# Patient Record
Sex: FEMALE | Race: WHITE | NOT HISPANIC OR LATINO | Employment: FULL TIME | URBAN - METROPOLITAN AREA
[De-identification: names, ages, dates, MRNs, and addresses within clinical notes are randomized per-mention and may not be internally consistent; named-entity substitution may affect disease eponyms.]

---

## 2017-02-28 ENCOUNTER — OFFICE VISIT (OUTPATIENT)
Dept: MEDICAL GROUP | Facility: PHYSICIAN GROUP | Age: 67
End: 2017-02-28
Payer: COMMERCIAL

## 2017-02-28 VITALS
OXYGEN SATURATION: 94 % | TEMPERATURE: 98.8 F | SYSTOLIC BLOOD PRESSURE: 124 MMHG | HEIGHT: 62 IN | HEART RATE: 80 BPM | RESPIRATION RATE: 16 BRPM | BODY MASS INDEX: 40.48 KG/M2 | WEIGHT: 220 LBS | DIASTOLIC BLOOD PRESSURE: 70 MMHG

## 2017-02-28 DIAGNOSIS — Z63.8 STRESS DUE TO FAMILY TENSION: ICD-10-CM

## 2017-02-28 DIAGNOSIS — E66.01 OBESITY, MORBID, BMI 40.0-49.9 (HCC): ICD-10-CM

## 2017-02-28 DIAGNOSIS — I10 ESSENTIAL HYPERTENSION: ICD-10-CM

## 2017-02-28 DIAGNOSIS — Z12.11 SCREEN FOR COLON CANCER: ICD-10-CM

## 2017-02-28 DIAGNOSIS — E55.9 VITAMIN D DEFICIENCY: ICD-10-CM

## 2017-02-28 DIAGNOSIS — E78.5 DYSLIPIDEMIA: ICD-10-CM

## 2017-02-28 DIAGNOSIS — Z12.39 SCREENING FOR BREAST CANCER: ICD-10-CM

## 2017-02-28 DIAGNOSIS — Z23 NEED FOR VACCINATION WITH 13-POLYVALENT PNEUMOCOCCAL CONJUGATE VACCINE: ICD-10-CM

## 2017-02-28 DIAGNOSIS — Z71.89 COUNSELING ON HEALTH PROMOTION AND DISEASE PREVENTION: ICD-10-CM

## 2017-02-28 PROCEDURE — 90670 PCV13 VACCINE IM: CPT | Performed by: NURSE PRACTITIONER

## 2017-02-28 PROCEDURE — 99215 OFFICE O/P EST HI 40 MIN: CPT | Mod: 25 | Performed by: NURSE PRACTITIONER

## 2017-02-28 PROCEDURE — 90471 IMMUNIZATION ADMIN: CPT | Performed by: NURSE PRACTITIONER

## 2017-02-28 SDOH — SOCIAL STABILITY - SOCIAL INSECURITY: OTHER SPECIFIED PROBLEMS RELATED TO PRIMARY SUPPORT GROUP: Z63.8

## 2017-02-28 ASSESSMENT — PATIENT HEALTH QUESTIONNAIRE - PHQ9: CLINICAL INTERPRETATION OF PHQ2 SCORE: 0

## 2017-02-28 NOTE — ASSESSMENT & PLAN NOTE
Managed with Prinzide 10-12.5 mg qd  No symptoms low BP: light-headed, tunnel-vision, unusual fatigue.   No symptoms high BP: pounding headache, visual changes, palpitations, flushed face. No medicine adverse effects: unusual fatigue, slow heartbeat, cough.

## 2017-02-28 NOTE — ASSESSMENT & PLAN NOTE
Her weight has decreased since last visit 6.2015, 12 pound weight loss. 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

## 2017-02-28 NOTE — MR AVS SNAPSHOT
"Alessia Middleton   2017 2:35 PM   Office Visit   MRN: [id number]    Department:  [facility]   Dept Phone:  [phone]    Description:  Female : 1950   Provider:  MONICA Joy           Reason for Visit     Annual Exam wellness exam       Allergies as of 2017     No Known Allergies      You were diagnosed with     Encounter for preventive health examination   [472786]       Counseling on health promotion and disease prevention   [991861]       Essential hypertension   [9446278]       Dyslipidemia   [549751]       Vitamin D deficiency   [5977076]       Obesity, morbid, BMI 40.0-49.9 (CMS-[facility])   [597233]       Screening for breast cancer   [766400]       Screen for colon cancer   [491303]       Need for vaccination with 13-polyvalent pneumococcal conjugate vaccine   [2824327]         Vital Signs     Blood Pressure Pulse Temperature Respirations Height Weight    124/70 mmHg 80 37.1 °C (98.8 °F) 16 1.575 m (5' 2\") 99.791 kg (220 lb)    Body Mass Index Oxygen Saturation Smoking Status             40.23 kg/m2 94% Current Every Day Smoker         Basic Information     Date Of Birth Sex Race Ethnicity Preferred Language    1950 Female White Non- English      Your appointments     Aug 29, 2017  2:55 PM   Established Patient with MONICA Joy   [facility])    [address] [place] [address], [address]  [facility] [id number] [phone]           You will be receiving a confirmation call a few days before your appointment from our automated call confirmation system.              Problem List              ICD-10-CM Priority Class Noted - Resolved    Dyslipidemia E78.5   2014 - Present    Essential hypertension I10   2014 - Present    Vitamin D deficiency E55.9   2015 - Present    Obesity, morbid, BMI 40.0-49.9 (CMS-[facility]) E66.01   2015 - Present      Health Maintenance        Date Due Completion Dates    IMM " DTaP/Tdap/Td Vaccine (1 - Tdap) 4/13/1967 ---    MAMMOGRAM 4/13/1988 ---    IMM ZOSTER VACCINE 4/13/2008 ---    BONE DENSITY 4/13/2013 ---    IMM PNEUMOCOCCAL 65+ (ADULT) LOW/MEDIUM RISK SERIES (1 of 2 - PCV13) 4/13/2013 ---    COLON CANCER SCREENING ANNUAL FIT 2/9/2017 2/9/2016            Current Immunizations     13-VALENT PCV PREVNAR  Incomplete    Influenza Vaccine Quad Inj (Preserved) 10/25/2016      Below and/or attached are the medications your provider expects you to take. Review all of your home medications and newly ordered medications with your provider and/or pharmacist. Follow medication instructions as directed by your provider and/or pharmacist. Please keep your medication list with you and share with your provider. Update the information when medications are discontinued, doses are changed, or new medications (including over-the-counter products) are added; and carry medication information at all times in the event of emergency situations     Allergies:  No Known Allergies          Medications  Valid as of: February 28, 2017 -  3:50 PM    Generic Name Brand Name Tablet Size Instructions for use    Aspirin (Tablet Delayed Response) ECOTRIN 81 MG Take 81 mg by mouth every day.        Cholecalciferol (Tab) cholecalciferol 1000 UNIT Take 1,000 Units by mouth every day.        Ergocalciferol (Cap) DRISDOL 88659 UNITS Take 1 Cap by mouth every 7 days.        Lisinopril-Hydrochlorothiazide (Tab) PRINZIDE, ZESTORETIC 10-12.5 MG TAKE ONE TABLET BY MOUTH ONCE DAILY        Pravastatin Sodium (Tab) PRAVACHOL 40 MG TAKE ONE TABLET BY MOUTH ONCE DAILY        .                 Medicines prescribed today were sent to:     [facility] [address] [place] [address] - [address] [place] [id number]    Phone: [phone] Fax: [phone]    Open 24 Hours?: No      Medication refill instructions:       If your prescription bottle indicates you have medication refills left, it is not necessary  to call your provider’s office. Please contact your pharmacy and they will refill your medication.    If your prescription bottle indicates you do not have any refills left, you may request refills at any time through one of the following ways: The online Secure Computing system (except Urgent Care), by calling your provider’s office, or by asking your pharmacy to contact your provider’s office with a refill request. Medication refills are processed only during regular business hours and may not be available until the next business day. Your provider may request additional information or to have a follow-up visit with you prior to refilling your medication.   *Please Note: Medication refills are assigned a new Rx number when refilled electronically. Your pharmacy may indicate that no refills were authorized even though a new prescription for the same medication is available at the pharmacy. Please request the medicine by name with the pharmacy before contacting your provider for a refill.        Your To Do List     Future Labs/Procedures Complete By Expires    OCCULT BLOOD FECES IMMUNOASSAY  As directed 3/1/2018    VITAMIN D,25 HYDROXY  As directed 3/1/2018         Secure Computing Access Code: [email or web address]  Expires: 3/30/2017  2:14 PM    Secure Computing  A secure, online tool to manage your health information     [employer]’s Secure Computing® is a secure, online tool that connects you to your personalized health information from the privacy of your home -- day or night - making it very easy for you to manage your healthcare. Once the activation process is completed, you can even access your medical information using the Secure Computing dalton, which is available for free in the Apple Dalton store or Google Play store.     Secure Computing provides the following levels of access (as shown below):   My Chart Features   [facility] Doctor [facility]  Specialists [facility]  Urgent  Care Non-[facility]  Primary Care  Doctor   Email your healthcare team securely and  privately 24/7 X X X    Manage appointments: schedule your next appointment; view details of past/upcoming appointments X      Request prescription refills. X      View recent personal medical records, including lab and immunizations X X X X   View health record, including health history, allergies, medications X X X X   Read reports about your outpatient visits, procedures, consult and ER notes X X X X   See your discharge summary, which is a recap of your hospital and/or ER visit that includes your diagnosis, lab results, and care plan. X X       How to register for [employer]:  1. Go to  [email or web address].  2. Click on the Sign Up Now box, which takes you to the New Member Sign Up page. You will need to provide the following information:  a. Enter your A[employer]Wizz Access Code exactly as it appears at the top of this page. (You will not need to use this code after you’ve completed the sign-up process. If you do not sign up before the expiration date, you must request a new code.)   b. Enter your date of birth.   c. Enter your home email address.   d. Click Submit, and follow the next screen’s instructions.  3. Create a AdsWizz ID. This will be your AdsWizz login ID and cannot be changed, so think of one that is secure and easy to remember.  4. Create a A[employer]Wizz password. You can change your password at any time.  5. Enter your Password Reset Question and Answer. This can be used at a later time if you forget your password.   6. Enter your e-mail address. This allows you to receive e-mail notifications when new information is available in AdsWizz.  7. Click Sign Up. You can now view your health information.    For assistance activating your AdsWizz account, call [phone]

## 2017-02-28 NOTE — PROGRESS NOTES
Subjective:     Chief Complaint   Patient presents with   • Annual Exam   • Chronic Care Management        Alessia Middleton is a 66 y.o. female here today for preventive exam but last appointment for evaluation and management of chronic conditions was June 2015.    Essential hypertension  Managed with Prinzide 10-12.5 mg qd  No symptoms low BP: light-headed, tunnel-vision, unusual fatigue.   No symptoms high BP: pounding headache, visual changes, palpitations, flushed face. No medicine adverse effects: unusual fatigue, slow heartbeat, cough.    Dyslipidemia  Managed with pravastatin 40 mg daily    Obesity, morbid, BMI 40.0-49.9 (CMS-[facility])  Her weight has decreased since last visit 6.2015, 12 pound weight loss. 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         She is experiencing some stress at this time secondary to caring for her mother. Her mother spent 12 hours away and she frequently has to visit for various medical issues. Her mother is still angry and blames her for the death of her father 5 years ago which is difficult. Patient's father was on hospice at the time of his death from GI..    ROS   Denies LOCKHART, chest pain, shortness of breath, abdominal pain, bladder or bowel changes, lower extremity edema.    Current medicines (including changes today)  Current Outpatient Prescriptions   Medication Sig Dispense Refill   • vitamin D (CHOLECALCIFEROL) 1000 UNIT Tab Take 1,000 Units by mouth every day.     • lisinopril-hydrochlorothiazide (PRINZIDE, ZESTORETIC) 10-12.5 MG per tablet TAKE ONE TABLET BY MOUTH ONCE DAILY 90 Tab 0   • pravastatin (PRAVACHOL) 40 MG tablet TAKE ONE TABLET BY MOUTH ONCE DAILY 90 Tab 0   • aspirin EC (ECOTRIN) 81 MG TBEC Take 81 mg by mouth every day.     • vitamin D, Ergocalciferol, (DRISDOL) 88705 UNITS Cap capsule Take 1 Cap by mouth every 7 days. 12 Cap 1     No current facility-administered medications for  "this visit.       She  has a past medical history of Hypertension; Hyperlipidemia; and Obesity, morbid, BMI 40.0-49.9 (CMS-[facility]) (6/17/2015).    Allergies Review of patient's allergies indicates no known allergies.    Current medications, problem list, allergies, past medical history, and tobacco use history reviewed in [facility] today.    Health maintenance reviewed and updated.    Objective:   Blood pressure 124/70, pulse 80, temperature 37.1 °C (98.8 °F), resp. rate 16, height 1.575 m (5' 2\"), weight 99.791 kg (220 lb), SpO2 94 %. Body mass index is 40.23 kg/(m^2).     Physical Exam   Constitutional: Alert, no acute distress. Pleasant and cooperative with the examination.  Skin:   Warm, dry, no rashes in visible areas.    Eyes:   Pupils equal, round. Conjunctiva and sclera clear,    Lids normal.  ENT:  Pinna normal. TM intact.     Oral mucous membranes pink.     Oropharynx clear.  Neck:   Supple, trachea midline.  Lungs:  Normal effort and respirations. Clear to auscultation bilaterally.  CV:  Regular rate and rhythm.  MS/Ext:  Steady gait, no edema.  Psych:  Eye contact is good, affect calm.    Assessment and Plan:   The following treatment plan was discussed    1. Essential hypertension  Stable. Continue current medicines. Monitor labs regularly.       2. Dyslipidemia  Unknown control. She'll continue current medications and have labs done as soon as possible.    3. Vitamin D deficiency  Unknown control, she'll continue daily supplement and have labs done.  - VITAMIN D,25 HYDROXY; Future    4. Obesity, morbid, BMI 40.0-49.9 (CMS-H[facility])    - Patient identified as having weight management issue.  Appropriate orders and counseling given.    5. Screening for breast cancer    - MA-SCREEN MAMMO W/CAD-BILAT    6. Screen for colon cancer    - OCCULT BLOOD FECES IMMUNOASSAY; Future    7.  Counseling on health promotion and disease prevention  Patient counseled regarding health promotion and safety including: immunizations, " screenings, skin care, diet, exercise, dental care, and eye care.     8. Need for vaccination with 13-polyvalent pneumococcal conjugate vaccine  The patient was counseled on the requirements, possible side effects, and future requirements of immunizations for today, including all of the components of combination vaccines.  The vaccinations were approved to be given. Immunizations given by MA after completion of vaccine screening checklist, under the direction of Dr. Nuvia Rios.    - PNEUMOCOCCAL CONJUGATE VACCINE 13-VALENT      Followup: Return in about 6 months (around 8/28/2017).  As scheduled, sooner if symptoms don't resolve or with any new problems.       Patient was seen for 40 minutes, more than 50% of time spent in face to face review, consultation, counseling, and arranging future evaluation and follow up of medical conditions and care.     Reviewed side effects, risks, and benefits of medications prescribed today.  Advised to take all medications as instructed and report any side effects.   The patient voices understanding and agrees.  Report any new or worsening symptoms.  Have labs or other diagnostic studies prior to follow up.  Keep all appointments for any referrals given.      Please note this dictation was created using voice recognition software. Every reasonable attempt has been made to correct obvious errors, however there may be errors of grammar and possibly content that were not discovered before finalizing the note.

## 2017-04-03 ENCOUNTER — HOSPITAL ENCOUNTER (OUTPATIENT)
Facility: MEDICAL CENTER | Age: 67
End: 2017-04-03
Attending: NURSE PRACTITIONER
Payer: COMMERCIAL

## 2017-04-03 PROCEDURE — 82274 ASSAY TEST FOR BLOOD FECAL: CPT

## 2017-04-04 ENCOUNTER — TELEPHONE (OUTPATIENT)
Dept: MEDICAL GROUP | Facility: PHYSICIAN GROUP | Age: 67
End: 2017-04-04

## 2017-04-04 DIAGNOSIS — I10 ESSENTIAL HYPERTENSION: ICD-10-CM

## 2017-04-04 DIAGNOSIS — E78.5 DYSLIPIDEMIA: ICD-10-CM

## 2017-04-04 NOTE — TELEPHONE ENCOUNTER
Call patient, vitamin D level is low so she should increase her daily supplement and take  2000 international units daily.  Also remind her to have fasting labs completed for cholesterol and metabolic panel. Orders in epic.  LAKEISHA Joy.

## 2017-04-11 DIAGNOSIS — Z12.11 SCREEN FOR COLON CANCER: ICD-10-CM

## 2017-04-12 LAB — HEMOCCULT STL QL IA: NEGATIVE

## 2017-04-18 NOTE — TELEPHONE ENCOUNTER
Was the patient seen in the last year in this department? Yes     Does patient have an active prescription for medications requested? No     Received Request Via: Pharmacy      Pt met protocol?: Yes, OV 2/17   BP Readings from Last 1 Encounters:   02/28/17 124/70

## 2017-04-19 RX ORDER — LISINOPRIL AND HYDROCHLOROTHIAZIDE 12.5; 1 MG/1; MG/1
TABLET ORAL
Qty: 90 TAB | Refills: 0 | Status: SHIPPED | OUTPATIENT
Start: 2017-04-19 | End: 2017-07-19 | Stop reason: SDUPTHER

## 2017-04-19 RX ORDER — PRAVASTATIN SODIUM 40 MG
TABLET ORAL
Qty: 90 TAB | Refills: 0 | Status: SHIPPED | OUTPATIENT
Start: 2017-04-19 | End: 2017-07-20 | Stop reason: SDUPTHER

## 2017-07-16 LAB
ALBUMIN SERPL-MCNC: 4.3 G/DL (ref 3.6–4.8)
ALBUMIN/GLOB SERPL: 1.7 {RATIO} (ref 1.2–2.2)
ALP SERPL-CCNC: 95 IU/L (ref 39–117)
ALT SERPL-CCNC: 13 IU/L (ref 0–32)
AST SERPL-CCNC: 14 IU/L (ref 0–40)
BILIRUB SERPL-MCNC: 0.3 MG/DL (ref 0–1.2)
BUN SERPL-MCNC: 12 MG/DL (ref 8–27)
BUN/CREAT SERPL: 13 (ref 12–28)
CALCIUM SERPL-MCNC: 9.6 MG/DL (ref 8.7–10.3)
CHLORIDE SERPL-SCNC: 101 MMOL/L (ref 96–106)
CHOLEST SERPL-MCNC: 175 MG/DL (ref 100–199)
CO2 SERPL-SCNC: 22 MMOL/L (ref 18–29)
COMMENT 011824: ABNORMAL
CREAT SERPL-MCNC: 0.95 MG/DL (ref 0.57–1)
GLOBULIN SER CALC-MCNC: 2.5 G/DL (ref 1.5–4.5)
GLUCOSE SERPL-MCNC: 115 MG/DL (ref 65–99)
HDLC SERPL-MCNC: 46 MG/DL
LDLC SERPL CALC-MCNC: 100 MG/DL (ref 0–99)
POTASSIUM SERPL-SCNC: 4.4 MMOL/L (ref 3.5–5.2)
PROT SERPL-MCNC: 6.8 G/DL (ref 6–8.5)
SODIUM SERPL-SCNC: 141 MMOL/L (ref 134–144)
TRIGL SERPL-MCNC: 144 MG/DL (ref 0–149)
VLDLC SERPL CALC-MCNC: 29 MG/DL (ref 5–40)

## 2017-07-21 RX ORDER — PRAVASTATIN SODIUM 40 MG
TABLET ORAL
Qty: 90 TAB | Refills: 1 | Status: SHIPPED | OUTPATIENT
Start: 2017-07-21 | End: 2018-01-11 | Stop reason: SDUPTHER

## 2017-07-21 RX ORDER — LISINOPRIL AND HYDROCHLOROTHIAZIDE 12.5; 1 MG/1; MG/1
TABLET ORAL
Qty: 90 TAB | Refills: 1 | Status: SHIPPED | OUTPATIENT
Start: 2017-07-21 | End: 2018-01-11 | Stop reason: SDUPTHER

## 2017-07-21 NOTE — TELEPHONE ENCOUNTER
Pt has had OV within the 12 month protocol and lipid panel is current. 6 month supply sent to pharmacy.   Lab Results   Component Value Date/Time    CHOLESTEROL, 07/15/2017 08:53 AM    * 07/15/2017 08:53 AM    HDL 46 07/15/2017 08:53 AM    TRIGLYCERIDES 144 07/15/2017 08:53 AM       Lab Results   Component Value Date/Time    SODIUM 141 07/15/2017 08:53 AM    POTASSIUM 4.4 07/15/2017 08:53 AM    CHLORIDE 101 07/15/2017 08:53 AM    CO2 22 07/15/2017 08:53 AM    GLUCOSE 115* 07/15/2017 08:53 AM    BUN 12 07/15/2017 08:53 AM    CREATININE 0.95 07/15/2017 08:53 AM    BUN-CREATININE RATIO 13 07/15/2017 08:53 AM     Lab Results   Component Value Date/Time    ALKALINE PHOSPHATASE 95 07/15/2017 08:53 AM    AST(SGOT) 14 07/15/2017 08:53 AM    ALT(SGPT) 13 07/15/2017 08:53 AM    TOTAL BILIRUBIN 0.3 07/15/2017 08:53 AM

## 2017-07-21 NOTE — TELEPHONE ENCOUNTER
Pt met protocol?: Yes pt last ov 2/17   BP Readings from Last 1 Encounters:   02/28/17 124/70     Lab Results   Component Value Date/Time    HDL 46 07/15/2017 08:53 AM

## 2017-08-29 ENCOUNTER — OFFICE VISIT (OUTPATIENT)
Dept: MEDICAL GROUP | Facility: PHYSICIAN GROUP | Age: 67
End: 2017-08-29
Payer: COMMERCIAL

## 2017-08-29 VITALS
OXYGEN SATURATION: 94 % | HEART RATE: 78 BPM | WEIGHT: 208 LBS | BODY MASS INDEX: 38.28 KG/M2 | DIASTOLIC BLOOD PRESSURE: 72 MMHG | SYSTOLIC BLOOD PRESSURE: 124 MMHG | TEMPERATURE: 99 F | HEIGHT: 62 IN | RESPIRATION RATE: 16 BRPM

## 2017-08-29 DIAGNOSIS — Z12.39 SCREENING FOR BREAST CANCER: ICD-10-CM

## 2017-08-29 DIAGNOSIS — E78.5 DYSLIPIDEMIA: ICD-10-CM

## 2017-08-29 DIAGNOSIS — E66.9 OBESITY, CLASS II, BMI 35-39.9: ICD-10-CM

## 2017-08-29 DIAGNOSIS — I10 ESSENTIAL HYPERTENSION: ICD-10-CM

## 2017-08-29 DIAGNOSIS — E55.9 VITAMIN D DEFICIENCY: ICD-10-CM

## 2017-08-29 PROCEDURE — 99214 OFFICE O/P EST MOD 30 MIN: CPT | Performed by: NURSE PRACTITIONER

## 2017-08-29 NOTE — PROGRESS NOTES
Subjective:     Chief Complaint   Patient presents with   • Results     review labs    • Hypertension     Alessia Middleton is a 67 y.o. female here today for evaluation and management of issues listed below.    Upcoming dental surgery, extractions. Thursday.    Essential hypertension  Managed with Prinzide 10-12.5 mg qd.   No symptoms low BP: light-headed, tunnel-vision, unusual fatigue.   No symptoms high BP: pounding headache, visual changes, palpitations, flushed face. No medicine adverse effects: unusual fatigue, slow heartbeat, cough.     Obesity, Class II, BMI 35-39.9 (CMS-[facility])  Her weight has been consistently decreasing since  6.2015, 24 pound weight loss. 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    Dyslipidemia  Managed with pravastatin 40 mg daily    Vitamin D deficiency  Previously taking over-the-counter supplement. 2014 vitamin D level was 14, 2015 vitamin D level was 20. She is now taking 2000 iu daily.     1. Essential hypertension    2. Obesity, Class II, BMI 35-39.9 (CMS-[facility])    3. Dyslipidemia    4. Vitamin D deficiency    5. Screening for breast cancer        Allergies: Review of patient's allergies indicates no known allergies.  Current medicines (including changes today)  Current Outpatient Prescriptions   Medication Sig Dispense Refill   • lisinopril-hydrochlorothiazide (PRINZIDE, ZESTORETIC) 10-12.5 MG per tablet TAKE ONE TABLET BY MOUTH ONCE DAILY 90 Tab 1   • pravastatin (PRAVACHOL) 40 MG tablet TAKE ONE TABLET BY MOUTH ONCE DAILY 90 Tab 1   • vitamin D (CHOLECALCIFEROL) 1000 UNIT Tab Take 1,000 Units by mouth every day.     • aspirin EC (ECOTRIN) 81 MG TBEC Take 81 mg by mouth every day.       No current facility-administered medications for this visit.        She  has a past medical history of Hyperlipidemia; Hypertension; and Obesity, morbid, BMI 40.0-49.9 (CMS-[facility]) (6/17/2015).    ROS   Denies HA, chest pain, shortness of breath, abdominal pain, bladder or bowel changes, lower  "extremity edema.    Health Maintenance: Reviewed and updated.      Objective:   Blood pressure 124/72, pulse 78, temperature 37.2 °C (99 °F), resp. rate 16, height 1.575 m (5' 2\"), weight 94.3 kg (208 lb), SpO2 94 %. Body mass index is 38.04 kg/m².  Physical Exam   Alert, no acute distress. Pleasant and cooperative with the examination.  Eye contact is good, affect calm.  Skin: Warm, dry, no rashes in visible areas.    Eyes: Pupils equal, round. Conjunctiva and sclera clear, lids normal.  ENT: Pinna normal.   Neck: Supple, trachea midline.  Lungs: Normal effort and respirations. Clear to auscultation bilaterally. Abdomen: No CVAT  CV: Regular rate and rhythm.  MS/Ext: Steady gait, no edema.    Results reviewed  7.15.17 labs    Assessment and Plan:   Treatment Changes: None  Alessia was seen today for results and hypertension.    Diagnoses and all orders for this visit:    Essential hypertension  Stable. Continue current medicines. Monitor labs regularly.      Obesity, Class II, BMI 35-39.9 (CMS-HCC)  -     Patient identified as having weight management issue.  Appropriate orders and counseling given.    Dyslipidemia  Stable. Continue current medicines. Monitor labs regularly.      Vitamin D deficiency  Stable. Continue current medicines. Monitor labs regularly.    Screening for breast cancer  -     MA-SCREEN MAMMO W/CAD-BILAT        Followup: Return in about 6 months (around 2/28/2018) for Preventive Care. Sooner should new symptoms or problems arise, or as previously scheduled.         Reviewed side effects, risks, and benefits of medications prescribed today.  Advised to take all medications as instructed and report any side effects.   The patient voices understanding and agrees.  Report any new or worsening symptoms.  Have labs or other diagnostic studies prior to follow up.  Keep all appointments for any referrals given.    Please note this dictation was created using voice recognition software. Every reasonable " attempt has been made to correct obvious errors, however there may be errors of grammar and possibly content that were not discovered before finalizing the note.

## 2017-08-29 NOTE — ASSESSMENT & PLAN NOTE
Previously taking over-the-counter supplement. 2014 vitamin D level was 14, 2015 vitamin D level was 20. She is now taking 2000 iu daily.

## 2017-08-29 NOTE — ASSESSMENT & PLAN NOTE
Managed with Prinzide 10-12.5 mg qd.   No symptoms low BP: light-headed, tunnel-vision, unusual fatigue.   No symptoms high BP: pounding headache, visual changes, palpitations, flushed face. No medicine adverse effects: unusual fatigue, slow heartbeat, cough.

## 2017-08-29 NOTE — ASSESSMENT & PLAN NOTE
Her weight has been consistently decreasing since  6.2015, 24 pound weight loss. 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

## 2018-01-08 ENCOUNTER — HOSPITAL ENCOUNTER (OUTPATIENT)
Facility: MEDICAL CENTER | Age: 68
End: 2018-01-08
Attending: NURSE PRACTITIONER
Payer: COMMERCIAL

## 2018-01-08 ENCOUNTER — OFFICE VISIT (OUTPATIENT)
Dept: URGENT CARE | Facility: PHYSICIAN GROUP | Age: 68
End: 2018-01-08
Payer: COMMERCIAL

## 2018-01-08 VITALS
BODY MASS INDEX: 36.21 KG/M2 | WEIGHT: 198 LBS | HEART RATE: 104 BPM | OXYGEN SATURATION: 91 % | SYSTOLIC BLOOD PRESSURE: 100 MMHG | TEMPERATURE: 99.8 F | DIASTOLIC BLOOD PRESSURE: 52 MMHG

## 2018-01-08 DIAGNOSIS — R30.0 DYSURIA: ICD-10-CM

## 2018-01-08 DIAGNOSIS — N30.01 ACUTE CYSTITIS WITH HEMATURIA: ICD-10-CM

## 2018-01-08 LAB
APPEARANCE UR: NORMAL
BILIRUB UR STRIP-MCNC: NEGATIVE MG/DL
COLOR UR AUTO: NORMAL
GLUCOSE UR STRIP.AUTO-MCNC: NEGATIVE MG/DL
KETONES UR STRIP.AUTO-MCNC: NEGATIVE MG/DL
LEUKOCYTE ESTERASE UR QL STRIP.AUTO: NORMAL
NITRITE UR QL STRIP.AUTO: POSITIVE
PH UR STRIP.AUTO: 5 [PH] (ref 5–8)
PROT UR QL STRIP: 100 MG/DL
RBC UR QL AUTO: NORMAL
SP GR UR STRIP.AUTO: 1.01
UROBILINOGEN UR STRIP-MCNC: NEGATIVE MG/DL

## 2018-01-08 PROCEDURE — 99214 OFFICE O/P EST MOD 30 MIN: CPT | Performed by: NURSE PRACTITIONER

## 2018-01-08 PROCEDURE — 87086 URINE CULTURE/COLONY COUNT: CPT

## 2018-01-08 PROCEDURE — 81002 URINALYSIS NONAUTO W/O SCOPE: CPT | Performed by: NURSE PRACTITIONER

## 2018-01-08 RX ORDER — SULFAMETHOXAZOLE AND TRIMETHOPRIM 800; 160 MG/1; MG/1
1 TABLET ORAL EVERY 12 HOURS
Qty: 6 TAB | Refills: 0 | Status: SHIPPED | OUTPATIENT
Start: 2018-01-08 | End: 2018-01-11

## 2018-01-08 ASSESSMENT — ENCOUNTER SYMPTOMS
VOMITING: 0
SHORTNESS OF BREATH: 0
FEVER: 0
MYALGIAS: 0
EYE PAIN: 0
NAUSEA: 0
FLANK PAIN: 0
SORE THROAT: 0
CHILLS: 1
DIZZINESS: 0

## 2018-01-09 DIAGNOSIS — N30.01 ACUTE CYSTITIS WITH HEMATURIA: ICD-10-CM

## 2018-01-09 DIAGNOSIS — R30.0 DYSURIA: ICD-10-CM

## 2018-01-09 NOTE — PROGRESS NOTES
Subjective:     Alessia Middleton is a 67 y.o. female who presents for Dysuria (Lower back pain  x6 days )       Dysuria    This is a new problem. Episode onset: 6 days. The problem occurs every urination. The problem has been gradually worsening. The quality of the pain is described as burning. The pain is at a severity of 6/10. The pain is moderate. The maximum temperature recorded prior to her arrival was 100 - 100.9 F. The fever has been present for less than 1 day. She is not sexually active. Associated symptoms include chills, frequency, hematuria and urgency. Pertinent negatives include no discharge, flank pain, nausea or vomiting. Treatments tried: azo. The treatment provided no relief. There is no history of recurrent UTIs or a single kidney.     Past Medical History:   Diagnosis Date   • Hyperlipidemia    • Hypertension    • Obesity, morbid, BMI 40.0-49.9 (CMS-[facility]) 6/17/2015     Past Surgical History:   Procedure Laterality Date   • ABDOMINAL HYSTERECTOMY TOTAL       Social History     Social History   • Marital status:      Spouse name: N/A   • Number of children: 2   • Years of education: N/A     Occupational History   •  [employer]     Social History Main Topics   • Smoking status: Current Every Day Smoker     Packs/day: 0.15     Years: 20.00     Types: Cigarettes   • Smokeless tobacco: Never Used   • Alcohol use No   • Drug use: No   • Sexual activity: Not on file     Other Topics Concern   • Not on file     Social History Narrative   • No narrative on file      Family History   Problem Relation Age of Onset   • Hyperlipidemia Mother    • Stroke Father    • Heart Disease Sister    • Hypertension Neg Hx    • Diabetes Neg Hx    • Cancer Neg Hx     Review of Systems   Constitutional: Positive for chills. Negative for fever.   HENT: Negative for sore throat.    Eyes: Negative for pain.   Respiratory: Negative for shortness of breath.    Cardiovascular: Negative for chest  pain.   Gastrointestinal: Negative for nausea and vomiting.   Genitourinary: Positive for dysuria, frequency, hematuria and urgency. Negative for flank pain.   Musculoskeletal: Negative for myalgias.   Skin: Negative for rash.   Neurological: Negative for dizziness.   No Known Allergies   Objective:   /52   Pulse (!) 104   Temp 37.7 °C (99.8 °F)   Wt 89.8 kg (198 lb)   SpO2 91%   BMI 36.21 kg/m²   Physical Exam   Constitutional: She is oriented to person, place, and time. She appears well-developed and well-nourished. No distress.   HENT:   Head: Normocephalic and atraumatic.   Eyes: Conjunctivae and EOM are normal. Pupils are equal, round, and reactive to light.   Cardiovascular: Normal rate and regular rhythm.    No murmur heard.  Pulmonary/Chest: Effort normal and breath sounds normal. No respiratory distress.   Abdominal: Soft. She exhibits no distension. There is no tenderness. There is no CVA tenderness.   Neurological: She is alert and oriented to person, place, and time. She has normal reflexes. No sensory deficit.   Skin: Skin is warm and dry.   Psychiatric: She has a normal mood and affect.         Assessment/Plan:   Assessment    1. Acute cystitis with hematuria  - POCT Urinalysis  - Urine Culture; Future  - sulfamethoxazole-trimethoprim (BACTRIM DS) 800-160 MG tablet; Take 1 Tab by mouth every 12 hours for 3 days.  Dispense: 6 Tab; Refill: 0    2. Dysuria  - POCT Urinalysis  - Urine Culture; Future  - sulfamethoxazole-trimethoprim (BACTRIM DS) 800-160 MG tablet; Take 1 Tab by mouth every 12 hours for 3 days.  Dispense: 6 Tab; Refill: 0       Pt. Was given ABX therapy today and will change therapy if culture indicates this is necessary. ER precautions given- worsening symptoms, back pain, abd. Pain, or fevers.   Pt. Is to increase fluids, and take the complete duration of the therapy.   Pt. Understands and agrees with the plan.   F/U with PCP in 3-4 days as needed.

## 2018-01-11 LAB
BACTERIA UR CULT: NORMAL
SIGNIFICANT IND 70042: NORMAL
SITE SITE: NORMAL
SOURCE SOURCE: NORMAL

## 2018-01-12 RX ORDER — LISINOPRIL AND HYDROCHLOROTHIAZIDE 12.5; 1 MG/1; MG/1
TABLET ORAL
Qty: 90 TAB | Refills: 1 | Status: SHIPPED | OUTPATIENT
Start: 2018-01-12 | End: 2018-07-09 | Stop reason: SDUPTHER

## 2018-01-12 RX ORDER — PRAVASTATIN SODIUM 40 MG
TABLET ORAL
Qty: 90 TAB | Refills: 1 | Status: SHIPPED | OUTPATIENT
Start: 2018-01-12 | End: 2018-07-09 | Stop reason: SDUPTHER

## 2018-01-12 NOTE — TELEPHONE ENCOUNTER
Was the patient seen in the last year in this department? Yes     Does patient have an active prescription for medications requested? No     Received Request Via: Pharmacy      Pt met protocol?: Yes pt last ov 8/17     Lab Results  Component Value Date/Time   CHOLSTRLTOT 175 07/15/2017 0853   TRIGLYCERIDE 144 07/15/2017 0853   HDL 46 07/15/2017 0853    (H) 07/15/2017 0853         BP Readings from Last 1 Encounters:   01/08/18 100/52

## 2018-01-12 NOTE — TELEPHONE ENCOUNTER
Pt has had OV within the 12 month protocol and lipid panel is current. 6 month supply sent to pharmacy.   Lab Results   Component Value Date/Time    CHOLSTRLTOT 175 07/15/2017 08:53 AM     (H) 07/15/2017 08:53 AM    HDL 46 07/15/2017 08:53 AM    TRIGLYCERIDE 144 07/15/2017 08:53 AM       Lab Results   Component Value Date/Time    SODIUM 141 07/15/2017 08:53 AM    POTASSIUM 4.4 07/15/2017 08:53 AM    CHLORIDE 101 07/15/2017 08:53 AM    CO2 22 07/15/2017 08:53 AM    GLUCOSE 115 (H) 07/15/2017 08:53 AM    BUN 12 07/15/2017 08:53 AM    CREATININE 0.95 07/15/2017 08:53 AM    BUNCREATRAT 13 07/15/2017 08:53 AM     Lab Results   Component Value Date/Time    ALKPHOSPHAT 95 07/15/2017 08:53 AM    ASTSGOT 14 07/15/2017 08:53 AM    ALTSGPT 13 07/15/2017 08:53 AM    TBILIRUBIN 0.3 07/15/2017 08:53 AM

## 2018-07-05 ENCOUNTER — TELEPHONE (OUTPATIENT)
Dept: MEDICAL GROUP | Facility: PHYSICIAN GROUP | Age: 68
End: 2018-07-05

## 2018-07-05 NOTE — TELEPHONE ENCOUNTER
Future Appointments       Provider Department Center    7/9/2018 2:45 PM Uyen Miller P.A.-C. [facility]    11/2/2018 2:45 PM Uyen Miller P.A.-C. [facility]        ESTABLISHED PATIENT PRE-VISIT PLANNING     Note: Patient will not be contacted if there is no indication to call.     1.  Reviewed notes from the last few office visits within the medical group: Yes    2.  If any orders were placed at last visit or intended to be done for this visit (i.e. 6 mos follow-up), do we have Results/Consult Notes?        •  Labs - Labs were not ordered at last office visit.       •  Imaging - Imaging ordered, NOT completed. Patient advised to complete prior to next appointment.       •  Referrals - No referrals were ordered at last office visit.    3. Is this appointment scheduled as a Hospital Follow-Up? No    4.  Immunizations were updated in [facility] using WebIZ?: Yes       •  Web Iz Recommendations: FLU, PNEUMOVAX (PPSV23), TDAP and ZOSTAVAX (Shingles)    5.  Patient is due for the following Health Maintenance Topics:   Health Maintenance Due   Topic Date Due   • IMM DTaP/Tdap/Td Vaccine (1 - Tdap) 04/13/1967   • MAMMOGRAM  04/13/1988   • BONE DENSITY  04/13/2013   • IMM PNEUMOCOCCAL  (2 of 2 - PPSV23) 02/28/2018   • COLON CANCER SCREENING ANNUAL FIT  04/03/2018       6.  MDX printed for Provider? NO [employer]     7.  Patient was informed to arrive 15 min prior to their scheduled appointment and bring in their medication bottles. SOFIYA

## 2018-07-09 ENCOUNTER — OFFICE VISIT (OUTPATIENT)
Dept: MEDICAL GROUP | Facility: PHYSICIAN GROUP | Age: 68
End: 2018-07-09
Payer: COMMERCIAL

## 2018-07-09 VITALS
OXYGEN SATURATION: 93 % | HEART RATE: 80 BPM | HEIGHT: 62 IN | BODY MASS INDEX: 36.44 KG/M2 | SYSTOLIC BLOOD PRESSURE: 108 MMHG | DIASTOLIC BLOOD PRESSURE: 66 MMHG | WEIGHT: 198 LBS | TEMPERATURE: 98.6 F

## 2018-07-09 DIAGNOSIS — Z23 NEED FOR VACCINATION: ICD-10-CM

## 2018-07-09 DIAGNOSIS — E78.5 DYSLIPIDEMIA: ICD-10-CM

## 2018-07-09 DIAGNOSIS — I10 ESSENTIAL HYPERTENSION: ICD-10-CM

## 2018-07-09 DIAGNOSIS — Z12.12 SCREENING FOR COLORECTAL CANCER: ICD-10-CM

## 2018-07-09 DIAGNOSIS — E66.9 OBESITY, CLASS II, BMI 35-39.9: ICD-10-CM

## 2018-07-09 DIAGNOSIS — Z12.11 SCREENING FOR COLORECTAL CANCER: ICD-10-CM

## 2018-07-09 DIAGNOSIS — E55.9 VITAMIN D DEFICIENCY: ICD-10-CM

## 2018-07-09 DIAGNOSIS — R73.01 IMPAIRED FASTING GLUCOSE: ICD-10-CM

## 2018-07-09 DIAGNOSIS — Z12.39 ENCOUNTER FOR SCREENING FOR MALIGNANT NEOPLASM OF BREAST: ICD-10-CM

## 2018-07-09 PROCEDURE — 90715 TDAP VACCINE 7 YRS/> IM: CPT | Performed by: PHYSICIAN ASSISTANT

## 2018-07-09 PROCEDURE — 99214 OFFICE O/P EST MOD 30 MIN: CPT | Mod: 25 | Performed by: PHYSICIAN ASSISTANT

## 2018-07-09 PROCEDURE — 90471 IMMUNIZATION ADMIN: CPT | Performed by: PHYSICIAN ASSISTANT

## 2018-07-09 PROCEDURE — 90732 PPSV23 VACC 2 YRS+ SUBQ/IM: CPT | Performed by: PHYSICIAN ASSISTANT

## 2018-07-09 PROCEDURE — 90472 IMMUNIZATION ADMIN EACH ADD: CPT | Performed by: PHYSICIAN ASSISTANT

## 2018-07-09 RX ORDER — LISINOPRIL AND HYDROCHLOROTHIAZIDE 12.5; 1 MG/1; MG/1
1 TABLET ORAL DAILY
Qty: 90 TAB | Refills: 1 | Status: SHIPPED | OUTPATIENT
Start: 2018-07-09 | End: 2018-12-31 | Stop reason: SDUPTHER

## 2018-07-09 RX ORDER — PRAVASTATIN SODIUM 40 MG
40 TABLET ORAL DAILY
Qty: 90 TAB | Refills: 1 | Status: SHIPPED | OUTPATIENT
Start: 2018-07-09 | End: 2018-12-31 | Stop reason: SDUPTHER

## 2018-07-09 ASSESSMENT — PATIENT HEALTH QUESTIONNAIRE - PHQ9: CLINICAL INTERPRETATION OF PHQ2 SCORE: 0

## 2018-07-09 NOTE — ASSESSMENT & PLAN NOTE
Currently treated with over-the-counter supplementation, 2000 IUs daily. Level was last checked in April 2017. Was previously on high-dose supplementation. Prior to starting treatment with vitamin D, patient was experiencing a lot of restless leg symptoms at night which have since resolved. She does have concerns about ongoing treatment with vitamin D causing liver dysfunction. She states that she heard about this on a commercial on TV.

## 2018-07-09 NOTE — ASSESSMENT & PLAN NOTE
Treated with lisinopril-hydrochlorothiazide 10-12.5 mg daily. Patient is compliant with medication and denies any side effects. States that she used to check her blood pressure readings at home but stopped doing it because blood pressure was always running normally. She denies any symptoms of elevated blood pressures including flushing, palpitations, chest pressure, headaches, or blurry vision. Requesting refills of medication today.

## 2018-07-09 NOTE — LETTER
[facility]  MONICA Joy  [address]  [facility] [address]  Fax: [phone]   Authorization for Release/Disclosure of   Protected Health Information   Name: JONNIE CANNON : 1950 SSN: [id number]   Address: [address] [place] [address] [place] [address] Phone:    [phone] (home) [phone] (work)   I authorize the entity listed below to release/disclose the PHI below to:   [facility]/MONICA Joy and Uyen Miller P.A.-C.   Provider or Entity Name:  [facility]    Address   [place], [place] [facility]   Phone:      Fax:  [phone]   Reason for request: continuity of care   Information to be released:    [  ] LAST COLONOSCOPY,  including any PATH REPORT and follow-up  [  ] LAST FIT/COLOGUARD RESULT [ xx ] LAST DEXA  [ xx ] LAST MAMMOGRAM  [  ] LAST PAP  [  ] LAST LABS [  ] RETINA EXAM REPORT  [  ] IMMUNIZATION RECORDS  [  ] Release all info      [  ] Check here and initial the line next to each item to release ALL health information INCLUDING  _____ Care and treatment for drug and / or alcohol abuse  _____ HIV testing, infection status, or AIDS  _____ Genetic Testing    DATES OF SERVICE OR TIME PERIOD TO BE DISCLOSED: _____________  I understand and acknowledge that:  * This Authorization may be revoked at any time by you in writing, except if your health information has already been used or disclosed.  * Your health information that will be used or disclosed as a result of you signing this authorization could be re-disclosed by the recipient. If this occurs, your re-disclosed health information may no longer be protected by State or Federal laws.  * You may refuse to sign this Authorization. Your refusal will not affect your ability to obtain treatment.  * This Authorization becomes effective upon signing and will  on (date) __________.      If no date is indicated, this Authorization will  one (1) year from the signature date.    Name:  Alessia Middleton    Signature:   Date:     7/9/2018       PLEASE FAX REQUESTED RECORDS BACK TO: [phone]

## 2018-07-09 NOTE — PROGRESS NOTES
Subjective:   Alessia Middleton is a 68 y.o. female here today for follow-up on hypertension and hyperlipidemia. Is a new patient to me and is also establishing care today.    Previous PCP: MAVERICK Roca    HPI: Patient has the following current medical problems:    Dyslipidemia  Treated with pravastatin 40 mg daily. Patient is compliant with medication and denies any side effects. Last lipid panel in July 2017.  Requesting refills of medication today.    Essential hypertension  Treated with lisinopril-hydrochlorothiazide 10-12.5 mg daily. Patient is compliant with medication and denies any side effects. States that she used to check her blood pressure readings at home but stopped doing it because blood pressure was always running normally. She denies any symptoms of elevated blood pressures including flushing, palpitations, chest pressure, headaches, or blurry vision. Requesting refills of medication today.    Impaired fasting glucose  Chronically elevated fasting glucose levels on previous lab work. Patient has not had A1c checked.    Obesity, Class II, BMI 35-39.9 (CMS-[facility])  Current BMI 36.21. 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 States has been steadily losing weight for several years.    Vitamin D deficiency  Currently treated with over-the-counter supplementation, 2000 IUs daily. Level was last checked in April 2017. Was previously on high-dose supplementation. Prior to starting treatment with vitamin D, patient was experiencing a lot of restless leg symptoms at night which have since resolved. She does have concerns about ongoing treatment with vitamin D causing liver dysfunction. She states that she heard about this on a commercial on TV.       Current medicines (including changes today)  Current Outpatient Prescriptions   Medication Sig Dispense Refill   • lisinopril-hydrochlorothiazide (PRINZIDE, ZESTORETIC) 10-12.5 MG per tablet Take 1 Tab by mouth every day. 90  "Tab 1   • pravastatin (PRAVACHOL) 40 MG tablet Take 1 Tab by mouth every day. 90 Tab 1   • aspirin EC (ECOTRIN) 81 MG TBEC Take 81 mg by mouth every day.       No current facility-administered medications for this visit.      She  has a past medical history of Hyperlipidemia; Hypertension; and Obesity, morbid, BMI 40.0-49.9 ([facility]) (6/17/2015).    ROS  As per HPI.       Objective:     Blood pressure 108/66, pulse 80, temperature 37 °C (98.6 °F), height 1.575 m (5' 2\"), weight 89.8 kg (198 lb), SpO2 93 %. Body mass index is 36.21 kg/m².     Physical Exam:  Constitutional: Alert, obese but otherwise well-appearing, no distress.  Skin: No rashes in visible areas.  Eye: Conjunctiva clear, lids normal.  ENMT: Lips without lesions, moist mucus membranes.  Respiratory: Unlabored respiratory effort, lungs clear to auscultation, no wheezes, no rhonchi.  Cardiovascular: Normal S1, S2, no murmur, no lower extremity edema.      Assessment and Plan:   The following treatment plan was discussed    1. Essential hypertension  Chronic issue, well controlled with current medication regimen. Blood pressure today in office is 108/66. Continue lisinopril-hydrochlorothiazide at the current dose. Refills ordered. Due for screening metabolic panel which I have ordered.  - lisinopril-hydrochlorothiazide (PRINZIDE, ZESTORETIC) 10-12.5 MG per tablet; Take 1 Tab by mouth every day.  Dispense: 90 Tab; Refill: 1  - COMP METABOLIC PANEL; Future    2. Vitamin D deficiency  Chronic issue, reasonably well controlled with vitamin D supplementation. Level last April was slightly low at 27.4. Recommend that she increase daily supplementation dose to 4000 international units daily. Should Finish off her current supply of over-the-counter capsules and then have level rechecked. Reassured patient that liver dysfunction would be extremely rare on the doses of vitamin D supplementation that she is on. Also advised her that her liver function is routinely " checked when she has her annual screening metabolic panel done and she has had no liver dysfunction to date.  - VITAMIN D,25 HYDROXY; Future    3. Dyslipidemia  Chronic issue, well controlled with daily statin. Reviewed last lipid panel from 7/2017 which was essentially normal. Continue current management with pravastatin. Refills ordered. Due for screening metabolic panel and repeat lipid panel which I have ordered.  - pravastatin (PRAVACHOL) 40 MG tablet; Take 1 Tab by mouth every day.  Dispense: 90 Tab; Refill: 1  - COMP METABOLIC PANEL; Future  - LIPID PROFILE; Future    4. Impaired fasting glucose  Chronic issue per lab review, appears to be worsening. Most recent fasting glucose was 115 on 7/2017 labs. Will recheck with metabolic panel and also recommend that she have A1c done which I have ordered.  - HEMOGLOBIN A1C; Future  - COMP METABOLIC PANEL; Future    5. Obesity, Class II, BMI 35-39.9 (CMS-HCC)  Patient counseled on weight loss today. Discussed [facility] health improvement program but patient feels she is doing fine on her own and would like to continue current management.  - Patient identified as having weight management issue.  Appropriate orders and counseling given.    6. Screening for colorectal cancer  Due for fit which patient is willing to have done.  - OCCULT BLOOD FECES IMMUNOASSAY (FIT); Future    7. Encounter for screening for malignant neoplasm of breast  Due for mammogram which patient is willing to have done.  - MA-MAMMO SCREENING BILAT W/APARNA W/CAD; Future    8. Need for vaccination  - TDAP VACCINE =>6YO IM  - PNEUMOCOCCAL POLYSACCHARIDE VACCINE 23-VALENT =>1YO SQ/IM      Followup: Return in about 6 months (around 1/9/2019) for routine f/u; Short.    Uyen Miller P.A.-C.

## 2018-07-09 NOTE — ASSESSMENT & PLAN NOTE
Chronically elevated fasting glucose levels on previous lab work. Patient has not had A1c checked.

## 2018-07-09 NOTE — ASSESSMENT & PLAN NOTE
Current BMI 36.21. 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 States has been steadily losing weight for several years.

## 2018-07-09 NOTE — ASSESSMENT & PLAN NOTE
Treated with pravastatin 40 mg daily. Patient is compliant with medication and denies any side effects. Last lipid panel in July 2017.  Requesting refills of medication today.

## 2018-07-10 ENCOUNTER — TELEPHONE (OUTPATIENT)
Dept: MEDICAL GROUP | Facility: PHYSICIAN GROUP | Age: 68
End: 2018-07-10

## 2018-07-10 NOTE — TELEPHONE ENCOUNTER
I have received a fax back from [facility] that they have no record of Pt last Mammo or Dexa. I called and LVM for Pt to call back with further info.

## 2018-07-20 NOTE — TELEPHONE ENCOUNTER
I have spoke with Pt and had her come in to sign med record release for [facility], she states she could have had it there. I got a fax back from [facility] stating they have no record of Pt.

## 2018-10-08 LAB
25(OH)D3+25(OH)D2 SERPL-MCNC: 40.1 NG/ML (ref 30–100)
ALBUMIN SERPL-MCNC: 4.4 G/DL (ref 3.5–4.8)
ALBUMIN/GLOB SERPL: 1.8 {RATIO} (ref 1.2–2.2)
ALP SERPL-CCNC: 92 IU/L (ref 39–117)
ALT SERPL-CCNC: 11 IU/L (ref 0–32)
AST SERPL-CCNC: 13 IU/L (ref 0–40)
BILIRUB SERPL-MCNC: 0.3 MG/DL (ref 0–1.2)
BUN SERPL-MCNC: 13 MG/DL (ref 8–27)
BUN/CREAT SERPL: 13 (ref 12–28)
CALCIUM SERPL-MCNC: 9.6 MG/DL (ref 8.7–10.3)
CHLORIDE SERPL-SCNC: 104 MMOL/L (ref 96–106)
CHOLEST SERPL-MCNC: 170 MG/DL (ref 100–199)
CO2 SERPL-SCNC: 22 MMOL/L (ref 20–29)
CREAT SERPL-MCNC: 0.97 MG/DL (ref 0.57–1)
GLOBULIN SER CALC-MCNC: 2.5 G/DL (ref 1.5–4.5)
GLUCOSE SERPL-MCNC: 105 MG/DL (ref 65–99)
HBA1C MFR BLD: 5.5 % (ref 4.8–5.6)
HDLC SERPL-MCNC: 45 MG/DL
IF AFRICAN AMERICAN  100797: 68 ML/MIN/1.73
IF NON AFRICAN AMER 100791: 59 ML/MIN/1.73
LABORATORY COMMENT REPORT: ABNORMAL
LDLC SERPL CALC-MCNC: 94 MG/DL (ref 0–99)
POTASSIUM SERPL-SCNC: 4.5 MMOL/L (ref 3.5–5.2)
PROT SERPL-MCNC: 6.9 G/DL (ref 6–8.5)
SODIUM SERPL-SCNC: 142 MMOL/L (ref 134–144)
TRIGL SERPL-MCNC: 153 MG/DL (ref 0–149)
VLDLC SERPL CALC-MCNC: 31 MG/DL (ref 5–40)

## 2018-10-09 ENCOUNTER — HOSPITAL ENCOUNTER (OUTPATIENT)
Facility: MEDICAL CENTER | Age: 68
End: 2018-10-09
Attending: PHYSICIAN ASSISTANT
Payer: COMMERCIAL

## 2018-10-09 ENCOUNTER — TELEPHONE (OUTPATIENT)
Dept: MEDICAL GROUP | Facility: PHYSICIAN GROUP | Age: 68
End: 2018-10-09

## 2018-10-09 PROCEDURE — 82274 ASSAY TEST FOR BLOOD FECAL: CPT

## 2018-10-09 NOTE — TELEPHONE ENCOUNTER
----- Message from Uyen Miller P.A.-C. sent at 10/9/2018  1:57 AM PDT -----  Please advise patient that lab work does not show anything overly concerning. Fasting blood sugar continues to be mildly elevated but A1c (diabetes screening test) was normal. There are mild elevations in the triglyceride level and slight decline in kidney function from a year ago. Should avoid anti-inflammatory medications like ibuprofen and naproxen as these can worsen kidney function. Vitamin D level was normal.  Uyen Miller P.A.-C.

## 2018-10-11 DIAGNOSIS — Z12.11 SCREENING FOR COLORECTAL CANCER: ICD-10-CM

## 2018-10-11 DIAGNOSIS — Z12.12 SCREENING FOR COLORECTAL CANCER: ICD-10-CM

## 2018-10-12 LAB — HEMOCCULT STL QL IA: NEGATIVE

## 2018-10-29 DIAGNOSIS — Z12.39 ENCOUNTER FOR SCREENING FOR MALIGNANT NEOPLASM OF BREAST: ICD-10-CM

## 2018-10-31 ENCOUNTER — TELEPHONE (OUTPATIENT)
Dept: MEDICAL GROUP | Facility: PHYSICIAN GROUP | Age: 68
End: 2018-10-31

## 2018-10-31 NOTE — TELEPHONE ENCOUNTER
----- Message from Uyen Miller P.A.-C. sent at 10/31/2018 12:47 AM PDT -----  Please inform patient of normal mammogram results.  Uyen Miller P.A.-C.

## 2018-10-31 NOTE — TELEPHONE ENCOUNTER
Phone Number Called: [phone] (home)    Message: Left message for patient to call back @ [phone]    Left Message for patient to call back: N\A

## 2018-12-31 DIAGNOSIS — I10 ESSENTIAL HYPERTENSION: ICD-10-CM

## 2018-12-31 DIAGNOSIS — E78.5 DYSLIPIDEMIA: ICD-10-CM

## 2019-01-02 NOTE — TELEPHONE ENCOUNTER
Was the patient seen in the last year in this department? Yes    Does patient have an active prescription for medications requested? No     Received Request Via: Pharmacy      Pt met protocol?: Yes, LIPID LABS 10/18 TRIGS UP, OV 7/18 /66

## 2019-01-03 RX ORDER — LISINOPRIL AND HYDROCHLOROTHIAZIDE 12.5; 1 MG/1; MG/1
TABLET ORAL
Qty: 90 TAB | Refills: 1 | Status: SHIPPED | OUTPATIENT
Start: 2019-01-03 | End: 2019-07-01 | Stop reason: SDUPTHER

## 2019-01-03 RX ORDER — PRAVASTATIN SODIUM 40 MG
TABLET ORAL
Qty: 90 TAB | Refills: 1 | Status: SHIPPED | OUTPATIENT
Start: 2019-01-03 | End: 2019-07-01 | Stop reason: SDUPTHER

## 2019-01-03 NOTE — TELEPHONE ENCOUNTER
Pt has had OV within the 12 month protocol and lipid panel is current. 6 month supply sent to pharmacy.   Lab Results   Component Value Date/Time    CHOLSTRLTOT 170 10/06/2018 08:31 AM    LDL 94 10/06/2018 08:31 AM    HDL 45 10/06/2018 08:31 AM    TRIGLYCERIDE 153 (H) 10/06/2018 08:31 AM       Lab Results   Component Value Date/Time    SODIUM 142 10/06/2018 08:31 AM    POTASSIUM 4.5 10/06/2018 08:31 AM    CHLORIDE 104 10/06/2018 08:31 AM    CO2 22 10/06/2018 08:31 AM    GLUCOSE 105 (H) 10/06/2018 08:31 AM    BUN 13 10/06/2018 08:31 AM    CREATININE 0.97 10/06/2018 08:31 AM    BUNCREATRAT 13 10/06/2018 08:31 AM     Lab Results   Component Value Date/Time    ALKPHOSPHAT 92 10/06/2018 08:31 AM    ASTSGOT 13 10/06/2018 08:31 AM    ALTSGPT 11 10/06/2018 08:31 AM    TBILIRUBIN 0.3 10/06/2018 08:31 AM

## 2019-07-01 DIAGNOSIS — I10 ESSENTIAL HYPERTENSION: ICD-10-CM

## 2019-07-01 DIAGNOSIS — E78.5 DYSLIPIDEMIA: ICD-10-CM

## 2019-07-01 NOTE — LETTER
Alessia Middleton  [address] [place] [address]            7/5/2019        Dear Alessia,      After several attempts, Uyen Miller P.A.-C.'s office has been unable to reach you by phone regarding your recent medication request. Refilled x 3 months. Please  schedule follow-up for future fills.        Kind regards,   Kallie Goss, Med Ass't

## 2019-07-02 RX ORDER — PRAVASTATIN SODIUM 40 MG
TABLET ORAL
Qty: 90 TAB | Refills: 0 | Status: SHIPPED | OUTPATIENT
Start: 2019-07-02 | End: 2019-09-25 | Stop reason: SDUPTHER

## 2019-07-02 RX ORDER — LISINOPRIL AND HYDROCHLOROTHIAZIDE 12.5; 1 MG/1; MG/1
TABLET ORAL
Qty: 90 TAB | Refills: 0 | Status: SHIPPED | OUTPATIENT
Start: 2019-07-02 | End: 2019-09-25 | Stop reason: SDUPTHER

## 2019-07-02 NOTE — TELEPHONE ENCOUNTER
Was the patient seen in the last year in this department? Yes    Does patient have an active prescription for medications requested? No     Received Request Via: Pharmacy      Pt met protocol?: Yes    LAST OV 07/09/2018    BP Readings from Last 1 Encounters:   07/09/18 108/66     Lab Results   Component Value Date/Time    CHOLSTRLTOT 170 10/06/2018 08:31 AM    LDL 94 10/06/2018 08:31 AM    HDL 45 10/06/2018 08:31 AM    TRIGLYCERIDE 153 (H) 10/06/2018 08:31 AM       Lab Results   Component Value Date/Time    SODIUM 142 10/06/2018 08:31 AM    POTASSIUM 4.5 10/06/2018 08:31 AM    CHLORIDE 104 10/06/2018 08:31 AM    CO2 22 10/06/2018 08:31 AM    GLUCOSE 105 (H) 10/06/2018 08:31 AM    BUN 13 10/06/2018 08:31 AM    CREATININE 0.97 10/06/2018 08:31 AM    BUNCREATRAT 13 10/06/2018 08:31 AM     Lab Results   Component Value Date/Time    ALKPHOSPHAT 92 10/06/2018 08:31 AM    ASTSGOT 13 10/06/2018 08:31 AM    ALTSGPT 11 10/06/2018 08:31 AM    TBILIRUBIN 0.3 10/06/2018 08:31 AM        Lab Results  Component Value Date/Time   CHOLSTRLTOT 170 10/06/2018 0831       Lab Results  Component Value Date/Time   TRIGLYCERIDE 153 (H) 10/06/2018 0831       Lab Results  Component Value Date/Time   HDL 45 10/06/2018 0831       Lab Results  Component Value Date/Time   LDL 94 10/06/2018 0831

## 2019-09-16 ENCOUNTER — OFFICE VISIT (OUTPATIENT)
Dept: MEDICAL GROUP | Facility: PHYSICIAN GROUP | Age: 69
End: 2019-09-16
Payer: COMMERCIAL

## 2019-09-16 VITALS
BODY MASS INDEX: 33.49 KG/M2 | HEART RATE: 70 BPM | SYSTOLIC BLOOD PRESSURE: 102 MMHG | WEIGHT: 182 LBS | OXYGEN SATURATION: 94 % | HEIGHT: 62 IN | DIASTOLIC BLOOD PRESSURE: 78 MMHG | TEMPERATURE: 98 F

## 2019-09-16 DIAGNOSIS — Z12.11 SCREENING FOR COLORECTAL CANCER: ICD-10-CM

## 2019-09-16 DIAGNOSIS — I10 ESSENTIAL HYPERTENSION: ICD-10-CM

## 2019-09-16 DIAGNOSIS — Z12.39 ENCOUNTER FOR SCREENING FOR MALIGNANT NEOPLASM OF BREAST: ICD-10-CM

## 2019-09-16 DIAGNOSIS — Z00.00 ANNUAL PHYSICAL EXAM: ICD-10-CM

## 2019-09-16 DIAGNOSIS — Z11.59 NEED FOR HEPATITIS C SCREENING TEST: ICD-10-CM

## 2019-09-16 DIAGNOSIS — Z12.12 SCREENING FOR COLORECTAL CANCER: ICD-10-CM

## 2019-09-16 DIAGNOSIS — E66.9 OBESITY (BMI 30-39.9): ICD-10-CM

## 2019-09-16 DIAGNOSIS — Z23 NEED FOR VACCINATION: ICD-10-CM

## 2019-09-16 DIAGNOSIS — E78.5 DYSLIPIDEMIA: ICD-10-CM

## 2019-09-16 DIAGNOSIS — Z78.0 POSTMENOPAUSAL: ICD-10-CM

## 2019-09-16 DIAGNOSIS — R73.01 IMPAIRED FASTING GLUCOSE: ICD-10-CM

## 2019-09-16 DIAGNOSIS — E55.9 VITAMIN D DEFICIENCY: ICD-10-CM

## 2019-09-16 PROCEDURE — 90662 IIV NO PRSV INCREASED AG IM: CPT | Performed by: PHYSICIAN ASSISTANT

## 2019-09-16 PROCEDURE — 90471 IMMUNIZATION ADMIN: CPT | Performed by: PHYSICIAN ASSISTANT

## 2019-09-16 PROCEDURE — 99397 PER PM REEVAL EST PAT 65+ YR: CPT | Mod: 25 | Performed by: PHYSICIAN ASSISTANT

## 2019-09-16 ASSESSMENT — PATIENT HEALTH QUESTIONNAIRE - PHQ9: CLINICAL INTERPRETATION OF PHQ2 SCORE: 0

## 2019-09-16 NOTE — ASSESSMENT & PLAN NOTE
Chronic issue, well-controlled with lisinopril-HCTZ 10-12.5 mg daily. BP remains at-goal. Continue current management.

## 2019-09-16 NOTE — ASSESSMENT & PLAN NOTE
Chronic issue, improving. Endorses a lot of physical activity at work--walks a lot. Counseled on importance of low-carbohydrate/sugar diet, especially in light of fasting glucose elevations on lab work.

## 2019-09-16 NOTE — ASSESSMENT & PLAN NOTE
Normal exam today with exception of bolded areas above.  HCM discussed with patient. Is agreeable for mammogram, DEXA, FIT, hep C screening, orders. Will have influenza vaccine today. Provided with Rx for Shingrix to have done at pharmacy.   Annual screening lab work ordered.  Counseled on diet, supplements, and exercise.

## 2019-09-16 NOTE — ASSESSMENT & PLAN NOTE
Chronic issue, well-controlled with over-the-counter supplement. Vitamin D level last checked in 10/2018 and was normal. Continue current management.

## 2019-09-16 NOTE — ASSESSMENT & PLAN NOTE
Chronically elevated fasting glucose levels on screening lab work but A1c normal. Counseled on importance of low-carbohydrate/sugar diet. Will continue to monitor at least annually.

## 2019-09-16 NOTE — ASSESSMENT & PLAN NOTE
Chronic issue, well-controlled with pravastatin 40 mg daily. Last lipid profile from 10/2018 reviewed. Continue current management. Will re-check lipids.

## 2019-09-16 NOTE — PROGRESS NOTES
"Chief Complaint: Annual    Alessia Middleton is a 69 y.o. female who presents for annual exam. Is an established patient of mine.    Last pap smear: N/A - s/p hysterectomy  Last mammogram: 10/6/18  Last FIT: 10/9/18  Bone density test: no  Hep C screening:  Shingrix: no  Last Td: 7/9/18  Regular exercise: yes - walks daily at work    Patient presents to the office today for annual exam.  She denies any changes to her health since last appointment.  She has hypertension and hyperlipidemia, both of which have been stable on medication.  She has lost almost 20 pounds since last visit.      She  has a past medical history of Hyperlipidemia, Hypertension, and Obesity, morbid, BMI 40.0-49.9 ([facility]) (6/17/2015).  She  has a past surgical history that includes abdominal hysterectomy total.  Current Outpatient Medications   Medication Sig Dispense Refill   • NON SPECIFIED 0.5 mL by Intramuscular route Once for 1 dose. 1 Each 1   • lisinopril-hydrochlorothiazide (PRINZIDE, ZESTORETIC) 10-12.5 MG per tablet TAKE 1 TABLET BY MOUTH ONCE DAILY 90 Tab 0   • pravastatin (PRAVACHOL) 40 MG tablet TAKE 1 TABLET BY MOUTH ONCE DAILY 90 Tab 0   • aspirin EC (ECOTRIN) 81 MG TBEC Take 81 mg by mouth every day.       No current facility-administered medications for this visit.      Social History     Tobacco Use   • Smoking status: Current Every Day Smoker     Packs/day: 0.15     Years: 20.00     Pack years: 3.00     Types: Cigarettes   • Smokeless tobacco: Never Used   Substance Use Topics   • Alcohol use: No     Alcohol/week: 0.0 oz   • Drug use: No       Review Of Systems  Respiratory: negative for dyspnea  Cardiovascular: negative for chest pain   Gastrointestinal: negative for heartburn or reflux, abdominal pain, constipation       PHYSICAL EXAMINATION:  /78 (BP Location: Right arm, Patient Position: Sitting, BP Cuff Size: Large adult)   Pulse 70   Temp 36.7 °C (98 °F)   Ht 1.562 m (5' 1.5\")   Wt 82.6 kg (182 lb)   SpO2 94% "   Body mass index is 33.83 kg/m².  Wt Readings from Last 4 Encounters:   09/16/19 82.6 kg (182 lb)   07/09/18 89.8 kg (198 lb)   01/08/18 89.8 kg (198 lb)   08/29/17 94.3 kg (208 lb)       Constitutional: Alert, obese but otherwise well-appearing, no distress.  Skin: Warm, dry, good turgor, no rashes or suspicious lesions in visible areas.  Eye: pupils equal, round and reactive to light, conjunctivae clear, lids normal.  ENMT: Lips without lesions, good dentition, oropharynx clear. Pinnae skin normal with no lesions. TM pearly gray with normal light reflex.   Neck: supple, no masses. No submandibular, anterior cervical adenopathy. No carotid bruits. No thyromegaly.  Respiratory: Unlabored respiratory effort, lungs clear to auscultation, no wheezes, no ronchi.  Cardiovascular: Normal S1, S2, no murmur, no peripheral edema.  Abdomen: Abdomen Soft, non-tender, no masses, no hepatosplenomegaly.  Psych: Alert and oriented x3, normal affect and mood.  Musc/Skel: Normal motion UE and LE proximal and distal.        ASSESSMENT/PLAN:    Annual physical exam  Normal exam today with exception of bolded areas above.  HCM discussed with patient. Is agreeable for mammogram, DEXA, FIT, hep C screening, orders. Will have influenza vaccine today. Provided with Rx for Shingrix to have done at pharmacy.   Annual screening lab work ordered.  Counseled on diet, supplements, and exercise.    Dyslipidemia  Chronic issue, well-controlled with pravastatin 40 mg daily. Last lipid profile from 10/2018 reviewed. Continue current management. Will re-check lipids.    Essential hypertension  Chronic issue, well-controlled with lisinopril-HCTZ 10-12.5 mg daily. BP remains at-goal. Continue current management.    Vitamin D deficiency  Chronic issue, well-controlled with over-the-counter supplement. Vitamin D level last checked in 10/2018 and was normal. Continue current management.    Impaired fasting glucose  Chronically elevated fasting glucose  levels on screening lab work but A1c normal. Counseled on importance of low-carbohydrate/sugar diet. Will continue to monitor at least annually.    Obesity (BMI 30-39.9)  Chronic issue, improving. Endorses a lot of physical activity at work--walks a lot. Counseled on importance of low-carbohydrate/sugar diet, especially in light of fasting glucose elevations on lab work.        Return in about 1 year (around 9/16/2020) for Annual; Short.    Uyen Miller P.A.-C.

## 2019-09-25 DIAGNOSIS — I10 ESSENTIAL HYPERTENSION: ICD-10-CM

## 2019-09-25 DIAGNOSIS — E78.5 DYSLIPIDEMIA: ICD-10-CM

## 2019-09-25 RX ORDER — LISINOPRIL AND HYDROCHLOROTHIAZIDE 12.5; 1 MG/1; MG/1
TABLET ORAL
Qty: 90 TAB | Refills: 0 | Status: SHIPPED | OUTPATIENT
Start: 2019-09-25 | End: 2019-12-30

## 2019-09-25 RX ORDER — PRAVASTATIN SODIUM 40 MG
TABLET ORAL
Qty: 90 TAB | Refills: 0 | Status: SHIPPED | OUTPATIENT
Start: 2019-09-25 | End: 2019-10-28

## 2019-09-25 NOTE — TELEPHONE ENCOUNTER
*PT NEEDS TO GET LABS UPDATED  Was the patient seen in the last year in this department? Yes    Does patient have an active prescription for medications requested? No     Received Request Via: Pharmacy      Pt met protocol?: NO    LAST OV 09/16/2019  BP Readings from Last 1 Encounters:   09/16/19 102/78     Lab Results   Component Value Date/Time    CHOLSTRLTOT 170 10/06/2018 08:31 AM    LDL 94 10/06/2018 08:31 AM    HDL 45 10/06/2018 08:31 AM    TRIGLYCERIDE 153 (H) 10/06/2018 08:31 AM       Lab Results   Component Value Date/Time    SODIUM 142 10/06/2018 08:31 AM    POTASSIUM 4.5 10/06/2018 08:31 AM    CHLORIDE 104 10/06/2018 08:31 AM    CO2 22 10/06/2018 08:31 AM    GLUCOSE 105 (H) 10/06/2018 08:31 AM    BUN 13 10/06/2018 08:31 AM    CREATININE 0.97 10/06/2018 08:31 AM    BUNCREATRAT 13 10/06/2018 08:31 AM     Lab Results   Component Value Date/Time    ALKPHOSPHAT 92 10/06/2018 08:31 AM    ASTSGOT 13 10/06/2018 08:31 AM    ALTSGPT 11 10/06/2018 08:31 AM    TBILIRUBIN 0.3 10/06/2018 08:31 AM         Lab Results   Component Value Date/Time    CHOLSTRLTOT 170 10/06/2018 08:31 AM    LDL 94 10/06/2018 08:31 AM    HDL 45 10/06/2018 08:31 AM    TRIGLYCERIDE 153 (H) 10/06/2018 08:31 AM       Lab Results   Component Value Date/Time    SODIUM 142 10/06/2018 08:31 AM    POTASSIUM 4.5 10/06/2018 08:31 AM    CHLORIDE 104 10/06/2018 08:31 AM    CO2 22 10/06/2018 08:31 AM    GLUCOSE 105 (H) 10/06/2018 08:31 AM    BUN 13 10/06/2018 08:31 AM    CREATININE 0.97 10/06/2018 08:31 AM    BUNCREATRAT 13 10/06/2018 08:31 AM     Lab Results   Component Value Date/Time    ALKPHOSPHAT 92 10/06/2018 08:31 AM    ASTSGOT 13 10/06/2018 08:31 AM    ALTSGPT 11 10/06/2018 08:31 AM    TBILIRUBIN 0.3 10/06/2018 08:31 AM

## 2019-10-07 ENCOUNTER — HOSPITAL ENCOUNTER (OUTPATIENT)
Facility: MEDICAL CENTER | Age: 69
End: 2019-10-07
Attending: PHYSICIAN ASSISTANT
Payer: COMMERCIAL

## 2019-10-07 PROCEDURE — 82274 ASSAY TEST FOR BLOOD FECAL: CPT

## 2019-10-09 DIAGNOSIS — Z12.11 SCREENING FOR COLORECTAL CANCER: ICD-10-CM

## 2019-10-09 DIAGNOSIS — Z12.12 SCREENING FOR COLORECTAL CANCER: ICD-10-CM

## 2019-10-11 LAB — HEMOCCULT STL QL IA: NEGATIVE

## 2019-10-14 ENCOUNTER — TELEPHONE (OUTPATIENT)
Dept: MEDICAL GROUP | Facility: PHYSICIAN GROUP | Age: 69
End: 2019-10-14

## 2019-10-14 NOTE — TELEPHONE ENCOUNTER
----- Message from Uyen Miller P.A.-C. sent at 10/14/2019 12:45 PM PDT -----  Please inform patient that her stool tested negative for blood.  It is recommended that this test be repeated in 1 year.  Uyen Miller P.A.-C.

## 2019-10-14 NOTE — TELEPHONE ENCOUNTER
Phone Number Called: [phone] (home) [phone] (work)    Call outcome: left message for patient to call back regarding message below

## 2019-10-15 NOTE — TELEPHONE ENCOUNTER
Phone Number Called: [phone] (home) [phone] (work)    Call outcome: left message for patient to call back regarding message below

## 2019-10-24 ENCOUNTER — HOSPITAL ENCOUNTER (OUTPATIENT)
Dept: LAB | Facility: MEDICAL CENTER | Age: 69
End: 2019-10-24
Attending: PHYSICIAN ASSISTANT
Payer: COMMERCIAL

## 2019-10-24 DIAGNOSIS — E78.5 DYSLIPIDEMIA: ICD-10-CM

## 2019-10-24 DIAGNOSIS — I10 ESSENTIAL HYPERTENSION: ICD-10-CM

## 2019-10-24 DIAGNOSIS — R73.01 IMPAIRED FASTING GLUCOSE: ICD-10-CM

## 2019-10-24 LAB
ALBUMIN SERPL BCP-MCNC: 4.4 G/DL (ref 3.2–4.9)
ALBUMIN/GLOB SERPL: 1.3 G/DL
ALP SERPL-CCNC: 94 U/L (ref 30–99)
ALT SERPL-CCNC: 13 U/L (ref 2–50)
ANION GAP SERPL CALC-SCNC: 10 MMOL/L (ref 0–11.9)
AST SERPL-CCNC: 13 U/L (ref 12–45)
BILIRUB SERPL-MCNC: 0.5 MG/DL (ref 0.1–1.5)
BUN SERPL-MCNC: 16 MG/DL (ref 8–22)
CALCIUM SERPL-MCNC: 10.1 MG/DL (ref 8.5–10.5)
CHLORIDE SERPL-SCNC: 102 MMOL/L (ref 96–112)
CHOLEST SERPL-MCNC: 192 MG/DL (ref 100–199)
CO2 SERPL-SCNC: 28 MMOL/L (ref 20–33)
CREAT SERPL-MCNC: 0.92 MG/DL (ref 0.5–1.4)
EST. AVERAGE GLUCOSE BLD GHB EST-MCNC: 114 MG/DL
GLOBULIN SER CALC-MCNC: 3.4 G/DL (ref 1.9–3.5)
GLUCOSE SERPL-MCNC: 87 MG/DL (ref 65–99)
HBA1C MFR BLD: 5.6 % (ref 0–5.6)
HDLC SERPL-MCNC: 53 MG/DL
LDLC SERPL CALC-MCNC: 111 MG/DL
POTASSIUM SERPL-SCNC: 3.8 MMOL/L (ref 3.6–5.5)
PROT SERPL-MCNC: 7.8 G/DL (ref 6–8.2)
SODIUM SERPL-SCNC: 140 MMOL/L (ref 135–145)
TRIGL SERPL-MCNC: 139 MG/DL (ref 0–149)

## 2019-10-24 PROCEDURE — 36415 COLL VENOUS BLD VENIPUNCTURE: CPT

## 2019-10-24 PROCEDURE — 80053 COMPREHEN METABOLIC PANEL: CPT

## 2019-10-24 PROCEDURE — 83036 HEMOGLOBIN GLYCOSYLATED A1C: CPT

## 2019-10-24 PROCEDURE — 80061 LIPID PANEL: CPT

## 2019-10-28 DIAGNOSIS — E78.5 DYSLIPIDEMIA: ICD-10-CM

## 2019-10-28 RX ORDER — PRAVASTATIN SODIUM 80 MG/1
TABLET ORAL
Qty: 90 TAB | Refills: 3 | Status: SHIPPED | OUTPATIENT
Start: 2019-10-28 | End: 2020-09-28 | Stop reason: SDUPTHER

## 2019-10-29 ENCOUNTER — TELEPHONE (OUTPATIENT)
Dept: MEDICAL GROUP | Facility: PHYSICIAN GROUP | Age: 69
End: 2019-10-29

## 2019-10-29 NOTE — TELEPHONE ENCOUNTER
----- Message from Uyen Miller P.A.-C. sent at 10/28/2019  9:42 PM PDT -----  Please inform patient that her bone density results are consistent with thinning of her bones, but not yet to the extent of osteoporosis. This is termed osteopenia. It is recommended that she consume around 1200 mg of calcium per day. Most of this should come from the diet, as it is better absorbed, but a daily supplement is recommended to help cover any dietary gaps. Daily vitamin D supplement, 1,000-2,000 IU is also recommended. Finally, weight-bearing exercise is important as it will help with bone strength. Recommend repeat DEXA in 5 years.  Uyen Miller P.A.-C.

## 2019-10-29 NOTE — TELEPHONE ENCOUNTER
----- Message from Uyen Miller P.A.-C. sent at 10/28/2019  9:36 PM PDT -----  Please inform patient that her recent screening mammogram done at [facility] came back normal.  Uyen Miller P.A.-C.

## 2019-10-29 NOTE — LETTER
Alessia Middleton  [address] [place] [address]            10/31/2019        Dear Alessia,      After several attempts, Uyen Miller P.A.-C.'s office has been unable to reach you by phone regarding your recent test results.     We ask that you contact us at [phone] at your earliest convenience. Our office hours are from 8:00a - 5:00p Monday thru Friday.    Kind regards,   Kallie Pleitez, Med Ass't

## 2019-10-29 NOTE — TELEPHONE ENCOUNTER
"----- Message from Uyen Miller P.A.-C. sent at 10/28/2019  9:40 PM PDT -----  Please inform patient that on review of her recent lab work, LDL (\"bad cholesterol\") is high so I recommend increased dose of pravastatin. I have sent new script for 80 mg tablets to [facility]. Should continue to take once daily. All other lab results are normal.  Uyen Miller P.A.-C.    "

## 2019-10-30 NOTE — TELEPHONE ENCOUNTER
Phone Number Called: [phone] (home) [phone] (work)    Call outcome: left message for patient to call back regarding message below

## 2019-12-17 PROBLEM — M85.88 OSTEOPENIA OF SPINE: Status: ACTIVE | Noted: 2019-12-17

## 2019-12-24 DIAGNOSIS — Z78.0 POSTMENOPAUSAL: ICD-10-CM

## 2019-12-26 DIAGNOSIS — E78.5 DYSLIPIDEMIA: ICD-10-CM

## 2019-12-26 DIAGNOSIS — I10 ESSENTIAL HYPERTENSION: ICD-10-CM

## 2019-12-27 ENCOUNTER — TELEPHONE (OUTPATIENT)
Dept: MEDICAL GROUP | Facility: PHYSICIAN GROUP | Age: 69
End: 2019-12-27

## 2019-12-27 NOTE — TELEPHONE ENCOUNTER
Called pt and LM advising her of her results and OTC medication recommendation. Gave [phone] if pt has any further comments, questions or concerns to call [facility] [place].

## 2019-12-27 NOTE — TELEPHONE ENCOUNTER
----- Message from Uyen Miller P.A.-C. sent at 12/27/2019  1:42 PM PST -----  Please inform patient that her recent bone density test shows thinning of your bones, but not yet to the extent of osteoporosis. This is termed osteopenia. It is recommended that she consume around 1200 mg of calcium per day. Most of this should come from her diet, as it is better absorbed, but a daily supplement is recommended to help cover any dietary gaps. Daily vitamin D supplement, 1,000-2,000 IU is also recommended. Finally, weight-bearing exercise is important as it will help with bone strength. Recommend repeat DEXA in 5 years.  Uyen Miller P.A.-C.

## 2019-12-30 RX ORDER — PRAVASTATIN SODIUM 40 MG
TABLET ORAL
Qty: 90 TAB | Refills: 0 | OUTPATIENT
Start: 2019-12-30

## 2019-12-30 RX ORDER — LISINOPRIL AND HYDROCHLOROTHIAZIDE 12.5; 1 MG/1; MG/1
TABLET ORAL
Qty: 90 TAB | Refills: 1 | Status: SHIPPED | OUTPATIENT
Start: 2019-12-30 | End: 2020-06-25

## 2019-12-30 NOTE — TELEPHONE ENCOUNTER
Was the patient seen in the last year in this department? Yes    Does patient have an active prescription for medications requested? No     Received Request Via: Patient      Pt met protocol?: Yes, OV 9/19   BP Readings from Last 1 Encounters:   09/16/19 102/78     Lab Results   Component Value Date/Time    CHOLSTRLTOT 192 10/24/2019 11:48 AM     (H) 10/24/2019 11:48 AM    HDL 53 10/24/2019 11:48 AM    TRIGLYCERIDE 139 10/24/2019 11:48 AM       Lab Results   Component Value Date/Time    SODIUM 140 10/24/2019 11:48 AM    POTASSIUM 3.8 10/24/2019 11:48 AM    CHLORIDE 102 10/24/2019 11:48 AM    CO2 28 10/24/2019 11:48 AM    GLUCOSE 87 10/24/2019 11:48 AM    BUN 16 10/24/2019 11:48 AM    CREATININE 0.92 10/24/2019 11:48 AM    BUNCREATRAT 13 10/06/2018 08:31 AM     Lab Results   Component Value Date/Time    ALKPHOSPHAT 94 10/24/2019 11:48 AM    ASTSGOT 13 10/24/2019 11:48 AM    ALTSGPT 13 10/24/2019 11:48 AM    TBILIRUBIN 0.5 10/24/2019 11:48 AM

## 2020-01-01 NOTE — TELEPHONE ENCOUNTER
Phone Number Called: [phone] (home) [phone] (work)    Call outcome: spoke to patient regarding message below    Message: Pt has been advised of DEXA results

## 2020-09-28 ENCOUNTER — OFFICE VISIT (OUTPATIENT)
Dept: MEDICAL GROUP | Facility: PHYSICIAN GROUP | Age: 70
End: 2020-09-28
Payer: COMMERCIAL

## 2020-09-28 VITALS
TEMPERATURE: 99.1 F | DIASTOLIC BLOOD PRESSURE: 70 MMHG | WEIGHT: 180.6 LBS | BODY MASS INDEX: 33.23 KG/M2 | SYSTOLIC BLOOD PRESSURE: 124 MMHG | HEIGHT: 62 IN | OXYGEN SATURATION: 95 % | HEART RATE: 70 BPM

## 2020-09-28 DIAGNOSIS — E78.5 DYSLIPIDEMIA: ICD-10-CM

## 2020-09-28 DIAGNOSIS — I10 ESSENTIAL HYPERTENSION: ICD-10-CM

## 2020-09-28 PROBLEM — Z00.00 ANNUAL PHYSICAL EXAM: Status: RESOLVED | Noted: 2019-09-16 | Resolved: 2020-09-28

## 2020-09-28 PROCEDURE — 99204 OFFICE O/P NEW MOD 45 MIN: CPT | Performed by: INTERNAL MEDICINE

## 2020-09-28 RX ORDER — LISINOPRIL AND HYDROCHLOROTHIAZIDE 12.5; 1 MG/1; MG/1
1 TABLET ORAL DAILY
Qty: 90 TAB | Refills: 1 | Status: SHIPPED | OUTPATIENT
Start: 2020-09-28 | End: 2021-03-18

## 2020-09-28 RX ORDER — PRAVASTATIN SODIUM 80 MG/1
TABLET ORAL
Qty: 90 TAB | Refills: 3 | Status: SHIPPED | OUTPATIENT
Start: 2020-09-28 | End: 2021-03-18 | Stop reason: SDUPTHER

## 2020-09-28 ASSESSMENT — PATIENT HEALTH QUESTIONNAIRE - PHQ9: CLINICAL INTERPRETATION OF PHQ2 SCORE: 0

## 2020-09-28 NOTE — PROGRESS NOTES
New Patient to establish    Chief Complaint   Patient presents with   • Medication Refill       Subjective:     History of Present Illness: Patient is a 70 y.o. female who is here today to medication refill    1. Essential hypertension  Chronic, stable, does not check blood pressure at home, compliant with Prinzide 10-12.5 mg daily, denies related symptoms    2. Dyslipidemia  LIPID:  Lab Results   Component Value Date/Time    CHOLSTRLTOT 192 10/24/2019 11:48 AM    CHOLSTRLTOT 170 10/06/2018 08:31 AM    CHOLSTRLTOT 175 07/15/2017 08:53 AM     (H) 10/24/2019 11:48 AM    LDL 94 10/06/2018 08:31 AM     (H) 07/15/2017 08:53 AM    HDL 53 10/24/2019 11:48 AM    HDL 45 10/06/2018 08:31 AM    HDL 46 07/15/2017 08:53 AM    TRIGLYCERIDE 139 10/24/2019 11:48 AM    TRIGLYCERIDE 153 (H) 10/06/2018 08:31 AM    TRIGLYCERIDE 144 07/15/2017 08:53 AM     >> Compliant with pravastatin as shown below, denied having symptoms  - pravastatin (PRAVACHOL) 80 MG tablet; TAKE 1 TABLET BY MOUTH ONCE DAILY  Dispense: 90 Tab; Refill: 3        Current Outpatient Medications on File Prior to Visit   Medication Sig Dispense Refill   • aspirin EC (ECOTRIN) 81 MG TBEC Take 81 mg by mouth every day.       No current facility-administered medications on file prior to visit.      No Known Allergies  Patient Active Problem List    Diagnosis Date Noted   • Osteopenia of spine 12/17/2019   • Impaired fasting glucose 07/09/2018   • Obesity (BMI 30-39.9) 06/17/2015   • Vitamin D deficiency 06/01/2015   • Dyslipidemia 05/29/2014   • Essential hypertension 05/29/2014     Past Medical History:   Diagnosis Date   • Annual physical exam 9/16/2019   • Hyperlipidemia    • Hypertension    • Obesity, morbid, BMI 40.0-49.9 ([facility]) 6/17/2015     Past Surgical History:   Procedure Laterality Date   • ABDOMINAL HYSTERECTOMY TOTAL       Family History   Problem Relation Age of Onset   • Hyperlipidemia Mother    • Stroke Father    • Heart Disease Sister    •  "Hypertension Neg Hx    • Diabetes Neg Hx    • Cancer Neg Hx      Social History     Tobacco Use   • Smoking status: Current Every Day Smoker     Packs/day: 0.15     Years: 20.00     Pack years: 3.00     Types: Cigarettes   • Smokeless tobacco: Never Used   Substance Use Topics   • Alcohol use: No     Alcohol/week: 0.0 oz   • Drug use: No       ROS:     - Constitutional: Negative for fever, chills,    - Eye: Negative for blurry vision    -ENT: Negative for ear pain    - Respiratory: Negative for cough, hemoptysis    - Cardiovascular: Negative for chest pain     - Gastrointestinal: Negative for abdominal pain    - Genitourinary: Negative for dysuria    - Musculoskeletal: Negative for joint swelling    - Skin: Negative for itching    - Neurological: Negative for focal weakness     - Psychiatric/Behavioral: Negative for depression        Physical Exam:     /70 (BP Location: Right arm, Patient Position: Sitting, BP Cuff Size: Large adult)   Pulse 70   Temp 37.3 °C (99.1 °F) (Temporal)   Ht 1.562 m (5' 1.5\")   Wt 81.9 kg (180 lb 9.6 oz)   SpO2 95%   BMI 33.57 kg/m²   General: Normal appearing. No distress.  ENT: oropharynx without exudates.    Eyes: conjunctiva clear lids without ptosis  Pulmonary: Clear to ausculation.  Normal effort.   Cardiovascular: Regular rate and rhythm  Abdomen: Soft, nontender,  Lymph: No cervical or supraclavicular palpable lymph nodes  Psych: Normal mood and affect.     I have reviewed pertinent labs and diagnostic tests associated with this visit with specific comments listed under the assessment and plan below      Assessment and Plan:     1. Essential hypertension  - lisinopril-hydrochlorothiazide (PRINZIDE) 10-12.5 MG per tablet; Take 1 Tab by mouth every day.  Dispense: 90 Tab; Refill: 1    2. Dyslipidemia  - pravastatin (PRAVACHOL) 80 MG tablet; TAKE 1 TABLET BY MOUTH ONCE DAILY  Dispense: 90 Tab; Refill: 3    >> Patient will get flu vaccine next month at local pharmacy, " follow-up with me in November 2024 for other vaccination, screening     Follow Up:   follow-up with me in November 2024 for other vaccination, screening, labs        Please note that this dictation was created using voice recognition software. I have made every reasonable attempt to correct obvious errors, but I expect that there are errors of grammar and possibly content that I did not discover before finalizing the note.    Signed by: Brandt Carlton M.D.

## 2020-09-30 DIAGNOSIS — E78.5 DYSLIPIDEMIA: ICD-10-CM

## 2020-09-30 DIAGNOSIS — I10 ESSENTIAL HYPERTENSION: ICD-10-CM

## 2020-10-01 RX ORDER — LISINOPRIL AND HYDROCHLOROTHIAZIDE 12.5; 1 MG/1; MG/1
1 TABLET ORAL DAILY
Qty: 90 TAB | Refills: 1 | OUTPATIENT
Start: 2020-10-01

## 2020-10-01 RX ORDER — PRAVASTATIN SODIUM 80 MG/1
TABLET ORAL
Qty: 90 TAB | Refills: 3 | OUTPATIENT
Start: 2020-10-01

## 2020-11-25 ENCOUNTER — OFFICE VISIT (OUTPATIENT)
Dept: MEDICAL GROUP | Facility: PHYSICIAN GROUP | Age: 70
End: 2020-11-25
Payer: COMMERCIAL

## 2020-11-25 ENCOUNTER — HOSPITAL ENCOUNTER (OUTPATIENT)
Dept: LAB | Facility: MEDICAL CENTER | Age: 70
End: 2020-11-25
Attending: INTERNAL MEDICINE
Payer: COMMERCIAL

## 2020-11-25 VITALS
OXYGEN SATURATION: 93 % | SYSTOLIC BLOOD PRESSURE: 112 MMHG | TEMPERATURE: 98.2 F | HEART RATE: 78 BPM | WEIGHT: 180.7 LBS | BODY MASS INDEX: 34.11 KG/M2 | HEIGHT: 61 IN | DIASTOLIC BLOOD PRESSURE: 52 MMHG

## 2020-11-25 DIAGNOSIS — E78.5 DYSLIPIDEMIA: ICD-10-CM

## 2020-11-25 DIAGNOSIS — Z11.59 NEED FOR HEPATITIS C SCREENING TEST: ICD-10-CM

## 2020-11-25 DIAGNOSIS — R73.01 IMPAIRED FASTING GLUCOSE: ICD-10-CM

## 2020-11-25 DIAGNOSIS — I10 ESSENTIAL HYPERTENSION: ICD-10-CM

## 2020-11-25 DIAGNOSIS — M85.88 OSTEOPENIA OF SPINE: ICD-10-CM

## 2020-11-25 DIAGNOSIS — Z23 NEED FOR VACCINATION: ICD-10-CM

## 2020-11-25 DIAGNOSIS — E55.9 VITAMIN D DEFICIENCY: ICD-10-CM

## 2020-11-25 DIAGNOSIS — Z12.11 SCREEN FOR COLON CANCER: ICD-10-CM

## 2020-11-25 DIAGNOSIS — E66.9 OBESITY (BMI 30-39.9): ICD-10-CM

## 2020-11-25 LAB
25(OH)D3 SERPL-MCNC: 50 NG/ML (ref 30–100)
ALBUMIN SERPL BCP-MCNC: 4.4 G/DL (ref 3.2–4.9)
ALBUMIN/GLOB SERPL: 1.5 G/DL
ALP SERPL-CCNC: 88 U/L (ref 30–99)
ALT SERPL-CCNC: 16 U/L (ref 2–50)
ANION GAP SERPL CALC-SCNC: 8 MMOL/L (ref 7–16)
AST SERPL-CCNC: 16 U/L (ref 12–45)
BILIRUB SERPL-MCNC: 0.3 MG/DL (ref 0.1–1.5)
BUN SERPL-MCNC: 16 MG/DL (ref 8–22)
CALCIUM SERPL-MCNC: 9.8 MG/DL (ref 8.5–10.5)
CHLORIDE SERPL-SCNC: 102 MMOL/L (ref 96–112)
CHOLEST SERPL-MCNC: 166 MG/DL (ref 100–199)
CO2 SERPL-SCNC: 26 MMOL/L (ref 20–33)
CREAT SERPL-MCNC: 0.86 MG/DL (ref 0.5–1.4)
EST. AVERAGE GLUCOSE BLD GHB EST-MCNC: 108 MG/DL
GLOBULIN SER CALC-MCNC: 2.9 G/DL (ref 1.9–3.5)
GLUCOSE SERPL-MCNC: 97 MG/DL (ref 65–99)
HBA1C MFR BLD: 5.4 % (ref 0–5.6)
HCV AB SER QL: NORMAL
HDLC SERPL-MCNC: 54 MG/DL
LDLC SERPL CALC-MCNC: 90 MG/DL
POTASSIUM SERPL-SCNC: 4.1 MMOL/L (ref 3.6–5.5)
PROT SERPL-MCNC: 7.3 G/DL (ref 6–8.2)
SODIUM SERPL-SCNC: 136 MMOL/L (ref 135–145)
TRIGL SERPL-MCNC: 108 MG/DL (ref 0–149)

## 2020-11-25 PROCEDURE — 36415 COLL VENOUS BLD VENIPUNCTURE: CPT

## 2020-11-25 PROCEDURE — 83036 HEMOGLOBIN GLYCOSYLATED A1C: CPT

## 2020-11-25 PROCEDURE — 82306 VITAMIN D 25 HYDROXY: CPT

## 2020-11-25 PROCEDURE — 99214 OFFICE O/P EST MOD 30 MIN: CPT | Mod: 25 | Performed by: INTERNAL MEDICINE

## 2020-11-25 PROCEDURE — 80061 LIPID PANEL: CPT

## 2020-11-25 PROCEDURE — 90471 IMMUNIZATION ADMIN: CPT | Performed by: INTERNAL MEDICINE

## 2020-11-25 PROCEDURE — 90750 HZV VACC RECOMBINANT IM: CPT | Performed by: INTERNAL MEDICINE

## 2020-11-25 PROCEDURE — 86803 HEPATITIS C AB TEST: CPT

## 2020-11-25 PROCEDURE — 80053 COMPREHEN METABOLIC PANEL: CPT

## 2020-11-25 NOTE — PROGRESS NOTES
Established Patient    Chief Complaint   Patient presents with   • Establish Care       Subjective:     HPI:   Alessia presents today with the following.    1. Obesity (BMI 30-39.9)  7. Impaired fasting glucose  A1c:   Lab Results   Component Value Date/Time    HBA1C 5.6 10/24/2019 1148    HBA1C 5.5 10/06/2018 0831    AVGLUC 114 10/24/2019 1148   >> Patient has some weight gain issues, as well as some evidence of insulin resistant, she has access to regular diet with some unhealthy sugars and carbs, however she has no concern about the above, she is still active and working      2. Vitamin D deficiency  6. Osteopenia of spine  She has evidence of the above.  DEXA scan, she is taking vitamin D 2000 units daily as well as calcium supplement    8. Essential hypertension  Chronic, stable, mention compliant with lisinopril-hydrochlorothiazide 10-12.5 mg daily, denied having related symptoms    9. Dyslipidemia  Mention taking pravastatin 80 mg daily    Patient Active Problem List    Diagnosis Date Noted   • Osteopenia of spine 12/17/2019   • Impaired fasting glucose 07/09/2018   • Obesity (BMI 30-39.9) 06/17/2015   • Vitamin D deficiency 06/01/2015   • Dyslipidemia 05/29/2014   • Essential hypertension 05/29/2014       Current Outpatient Medications on File Prior to Visit   Medication Sig Dispense Refill   • lisinopril-hydrochlorothiazide (PRINZIDE) 10-12.5 MG per tablet Take 1 Tab by mouth every day. 90 Tab 1   • pravastatin (PRAVACHOL) 80 MG tablet TAKE 1 TABLET BY MOUTH ONCE DAILY 90 Tab 3   • aspirin EC (ECOTRIN) 81 MG TBEC Take 81 mg by mouth every day.       No current facility-administered medications on file prior to visit.        Allergies, past medical history, past surgical history, family history, social history reviewed and updated    ROS:  All other systems reviewed and are negative except as stated in the HPI     Physical Exam:     /52 (BP Location: Left arm, Patient Position: Sitting, BP Cuff Size:  "Adult)   Pulse 78   Temp 36.8 °C (98.2 °F) (Temporal)   Ht 1.56 m (5' 1.42\")   Wt 82 kg (180 lb 11.2 oz)   SpO2 93%   BMI 33.68 kg/m²   General: Normal appearing. No distress.  ENT: oropharynx without exudates.    Eyes: conjunctiva clear lids without ptosis  Pulmonary: Clear to ausculation.  Normal effort.   Cardiovascular: Regular rate and rhythm  Abdomen: Soft, nontender,  Lymph: No cervical or supraclavicular palpable lymph nodes  Psych: Normal mood and affect.     I have reviewed pertinent labs and diagnostic tests associated with this visit with specific comments listed under the assessment and plan below      Assessment and Plan:     70 y.o. female with the following issues.    1. Obesity (BMI 30-39.9)  7. Impaired fasting glucose  - HEMOGLOBIN A1C; Future  -Educated regarding healthy lifestyle, healthy diet,      2. Vitamin D deficiency  6. Osteopenia of spine  Continue vitamin D supplement, resistant exercise as tolerated, eating healthier options with calcium resources    3. Need for hepatitis C screening test  - HEP C VIRUS ANTIBODY; Future    4. Screen for colon cancer  - OCCULT BLOOD FECES IMMUNOASSAY; Future    5. Need for vaccination  - Shingles Vaccine (SHINGRIX)    6. Osteopenia of spine  - VITAMIN D,25 HYDROXY; Future      8. Essential hypertension  - Comp Metabolic Panel; Future  -Continue blood pressure medications above,     9. Dyslipidemia  - Lipid Profile; Future  Continue statin as above      Follow Up:      Return in about 6 months (around 5/25/2021) for follow up.  If labs are abnormal, will see patient sooner    Please note that this dictation was created using voice recognition software. I have made every reasonable attempt to correct obvious errors, but I expect that there are errors of grammar and possibly content that I did not discover before finalizing the note.    Signed by: Brandt Carlton M.D.    "

## 2020-11-30 ENCOUNTER — HOSPITAL ENCOUNTER (OUTPATIENT)
Facility: MEDICAL CENTER | Age: 70
End: 2020-11-30
Attending: INTERNAL MEDICINE
Payer: COMMERCIAL

## 2020-11-30 PROCEDURE — 82274 ASSAY TEST FOR BLOOD FECAL: CPT

## 2020-12-03 DIAGNOSIS — Z12.11 SCREEN FOR COLON CANCER: ICD-10-CM

## 2020-12-05 LAB — HEMOCCULT STL QL IA: NEGATIVE

## 2021-01-15 DIAGNOSIS — Z23 NEED FOR VACCINATION: ICD-10-CM

## 2021-03-17 DIAGNOSIS — I10 ESSENTIAL HYPERTENSION: ICD-10-CM

## 2021-03-18 DIAGNOSIS — E78.5 DYSLIPIDEMIA: ICD-10-CM

## 2021-03-18 DIAGNOSIS — I10 ESSENTIAL HYPERTENSION: ICD-10-CM

## 2021-03-18 RX ORDER — PRAVASTATIN SODIUM 80 MG/1
TABLET ORAL
Qty: 90 TABLET | Refills: 0 | Status: SHIPPED | OUTPATIENT
Start: 2021-03-18 | End: 2021-05-25 | Stop reason: SDUPTHER

## 2021-03-18 RX ORDER — LISINOPRIL AND HYDROCHLOROTHIAZIDE 12.5; 1 MG/1; MG/1
1 TABLET ORAL DAILY
Qty: 90 TABLET | Refills: 1 | OUTPATIENT
Start: 2021-03-18

## 2021-03-18 RX ORDER — LISINOPRIL AND HYDROCHLOROTHIAZIDE 12.5; 1 MG/1; MG/1
TABLET ORAL
Qty: 90 TABLET | Refills: 0 | Status: SHIPPED | OUTPATIENT
Start: 2021-03-18 | End: 2021-05-25 | Stop reason: SDUPTHER

## 2021-05-25 ENCOUNTER — OFFICE VISIT (OUTPATIENT)
Dept: MEDICAL GROUP | Facility: PHYSICIAN GROUP | Age: 71
End: 2021-05-25
Payer: COMMERCIAL

## 2021-05-25 VITALS
BODY MASS INDEX: 33.61 KG/M2 | DIASTOLIC BLOOD PRESSURE: 62 MMHG | SYSTOLIC BLOOD PRESSURE: 122 MMHG | HEART RATE: 79 BPM | OXYGEN SATURATION: 94 % | WEIGHT: 178 LBS | TEMPERATURE: 97.1 F | HEIGHT: 61 IN

## 2021-05-25 DIAGNOSIS — E66.9 OBESITY (BMI 30-39.9): ICD-10-CM

## 2021-05-25 DIAGNOSIS — Z23 NEED FOR VACCINATION: ICD-10-CM

## 2021-05-25 DIAGNOSIS — E78.5 DYSLIPIDEMIA: ICD-10-CM

## 2021-05-25 DIAGNOSIS — I10 ESSENTIAL HYPERTENSION: ICD-10-CM

## 2021-05-25 PROCEDURE — 90471 IMMUNIZATION ADMIN: CPT | Performed by: INTERNAL MEDICINE

## 2021-05-25 PROCEDURE — 90750 HZV VACC RECOMBINANT IM: CPT | Performed by: INTERNAL MEDICINE

## 2021-05-25 PROCEDURE — 99214 OFFICE O/P EST MOD 30 MIN: CPT | Mod: 25 | Performed by: INTERNAL MEDICINE

## 2021-05-25 RX ORDER — PRAVASTATIN SODIUM 80 MG/1
TABLET ORAL
Qty: 90 TABLET | Refills: 3 | Status: SHIPPED | OUTPATIENT
Start: 2021-05-25

## 2021-05-25 RX ORDER — LISINOPRIL AND HYDROCHLOROTHIAZIDE 12.5; 1 MG/1; MG/1
1 TABLET ORAL DAILY
Qty: 90 TABLET | Refills: 3 | Status: SHIPPED | OUTPATIENT
Start: 2021-05-25

## 2021-05-25 ASSESSMENT — PATIENT HEALTH QUESTIONNAIRE - PHQ9: CLINICAL INTERPRETATION OF PHQ2 SCORE: 0

## 2021-05-25 NOTE — PATIENT INSTRUCTIONS
Natural vitamins from whole foods (fruit and vegetable)  Vitamin B complex supplement (methylcobalamin B12, methyl folate B9).   Magnesium glycinate/malate supplement 400 mg daily  Vitamin D3 supplement   Essential oil supplement (cod liver oil)  Turmeric, maddy, Boswellia combination supplement

## 2021-05-25 NOTE — PROGRESS NOTES
"Established Patient    Chief Complaint   Patient presents with   • Follow-Up       Subjective:     HPI:   Alessia presents today with the following.    1. Essential hypertension  Chronic, stable, denies any related symptoms, patient needs refill  - lisinopril-hydrochlorothiazide (PRINZIDE) 10-12.5 MG per tablet; Take 1 tablet by mouth every day.  Dispense: 90 tablet; Refill: 3    4. Obesity (BMI 30-39.9)  Reported that she has some movement at work, otherwise maintaining her weight with regular diet, she is not on any specific diet for that      Patient Active Problem List    Diagnosis Date Noted   • Osteopenia of spine 12/17/2019   • Impaired fasting glucose 07/09/2018   • Obesity (BMI 30-39.9) 06/17/2015   • Vitamin D deficiency 06/01/2015   • Dyslipidemia 05/29/2014   • Essential hypertension 05/29/2014       Current Outpatient Medications on File Prior to Visit   Medication Sig Dispense Refill   • aspirin EC (ECOTRIN) 81 MG TBEC Take 81 mg by mouth every day.       No current facility-administered medications on file prior to visit.       Allergies, past medical history, past surgical history, family history, social history reviewed and updated    ROS:  All other systems reviewed and are negative except as stated in the HPI     Physical Exam:     /62 (BP Location: Right arm, Patient Position: Sitting, BP Cuff Size: Large adult)   Pulse 79   Temp 36.2 °C (97.1 °F) (Temporal)   Ht 1.56 m (5' 1.42\")   Wt 80.7 kg (178 lb)   SpO2 94%   BMI 33.18 kg/m²   General: Normal appearing. No distress.  ENT: oropharynx without exudates.    Eyes: conjunctiva clear lids without ptosis  Pulmonary: Clear to ausculation.  Normal effort.   Cardiovascular: Regular rate and rhythm  Abdomen: Soft, nontender,  Lymph: No cervical or supraclavicular palpable lymph nodes  Psych: Normal mood and affect.     I have reviewed pertinent labs and diagnostic tests associated with this visit with specific comments listed under the " assessment and plan below      Assessment and Plan:     71 y.o. female with the following issues.    1. Essential hypertension  - lisinopril-hydrochlorothiazide (PRINZIDE) 10-12.5 MG per tablet; Take 1 tablet by mouth every day.  Dispense: 90 tablet; Refill: 3  -discussion in details on target blood pressure goal, advised monitoring BP closely at home.  Have BP log to present at follow-up visit or send through my chart.   -Advised low-salt diet, healthy dietary option include plenty of vegetable, reduce carb and sugar, regular exercise as tolerated, healthy fat/protein, also avoid alcohol, no NSAIDs  If symptoms worsen or persist patient can return to clinic for reevaluation.  Red flags and strict emergency room precautions discussed.  Discussed side effects of medication. Patient confirmed understanding of information.    3. Need for vaccination  - Shingrix Vaccine    4. Obesity (BMI 30-39.9)  -discussion regarding healthy dietary options including plenty of vegetable, avoid sugars, regular exercise as tolerated, healthy fat/protein/carbs      Follow Up:      Return in about 6 months (around 11/25/2021) for follow up.  Chronic condition, labs  Please note that this dictation was created using voice recognition software. I have made every reasonable attempt to correct obvious errors, but I expect that there are errors of grammar and possibly content that I did not discover before finalizing the note.    Signed by: Brandt Carlton M.D.

## 2021-09-11 ENCOUNTER — APPOINTMENT (OUTPATIENT)
Dept: RADIOLOGY | Facility: IMAGING CENTER | Age: 71
End: 2021-09-11
Attending: FAMILY MEDICINE
Payer: COMMERCIAL

## 2021-09-11 ENCOUNTER — OFFICE VISIT (OUTPATIENT)
Dept: URGENT CARE | Facility: PHYSICIAN GROUP | Age: 71
End: 2021-09-11
Payer: COMMERCIAL

## 2021-09-11 VITALS
BODY MASS INDEX: 33.13 KG/M2 | OXYGEN SATURATION: 93 % | HEART RATE: 70 BPM | WEIGHT: 180 LBS | TEMPERATURE: 97.8 F | DIASTOLIC BLOOD PRESSURE: 78 MMHG | RESPIRATION RATE: 20 BRPM | HEIGHT: 62 IN | SYSTOLIC BLOOD PRESSURE: 120 MMHG

## 2021-09-11 DIAGNOSIS — M25.552 LEFT HIP PAIN: ICD-10-CM

## 2021-09-11 DIAGNOSIS — M54.16 LUMBAR RADICULOPATHY: ICD-10-CM

## 2021-09-11 DIAGNOSIS — M16.12 OSTEOARTHRITIS OF LEFT HIP, UNSPECIFIED OSTEOARTHRITIS TYPE: ICD-10-CM

## 2021-09-11 PROCEDURE — 73502 X-RAY EXAM HIP UNI 2-3 VIEWS: CPT | Mod: TC,LT | Performed by: FAMILY MEDICINE

## 2021-09-11 PROCEDURE — 99214 OFFICE O/P EST MOD 30 MIN: CPT | Performed by: FAMILY MEDICINE

## 2021-09-11 RX ORDER — PREDNISONE 20 MG/1
40 TABLET ORAL DAILY
Qty: 10 TABLET | Refills: 0 | Status: SHIPPED | OUTPATIENT
Start: 2021-09-11 | End: 2021-09-16

## 2021-09-11 RX ORDER — GABAPENTIN 300 MG/1
300 CAPSULE ORAL NIGHTLY PRN
Qty: 30 CAPSULE | Refills: 0 | Status: SHIPPED | OUTPATIENT
Start: 2021-09-11 | End: 2021-10-11

## 2021-09-11 ASSESSMENT — ENCOUNTER SYMPTOMS
EYE DISCHARGE: 0
EYE REDNESS: 0
VOMITING: 0
MYALGIAS: 0
NAUSEA: 0
WEIGHT LOSS: 0

## 2021-09-11 NOTE — PROGRESS NOTES
"Veronica Middleton is a 71 y.o. female who presents with Pain (left side lower back pain, and lower left side abdomen pain)            2 days left hip, groin, and leg pain. Radiates to both anterior and posterior leg. Waxes and wanes. Severe at night. Sitting and turning from side to side seems to help. No myelopathy. No fever. No PMH cancer. No unwanted weight loss. No weakness. Tylenol with some relief. No other aggravating or alleviating factors.        Review of Systems   Constitutional: Negative for malaise/fatigue and weight loss.   Eyes: Negative for discharge and redness.   Gastrointestinal: Negative for nausea and vomiting.   Musculoskeletal: Negative for joint pain and myalgias.   Skin: Negative for itching and rash.              Objective     /78 (BP Location: Right arm, Patient Position: Sitting, BP Cuff Size: Adult)   Pulse 70   Temp 36.6 °C (97.8 °F) (Temporal)   Resp 20   Ht 1.575 m (5' 2\")   Wt 81.6 kg (180 lb)   SpO2 93%   BMI 32.92 kg/m²      Physical Exam  Constitutional:       General: She is not in acute distress.     Appearance: She is well-developed.   HENT:      Head: Normocephalic and atraumatic.   Eyes:      Conjunctiva/sclera: Conjunctivae normal.   Musculoskeletal:      Comments: Area of perceived pain diffusely through left hip and left lower extremity.  Mild point tenderness left SI joint.  Mild point tenderness hip joint and groin.  No pain reproduced with internal and external rotation.   Skin:     General: Skin is warm and dry.      Findings: No rash.   Neurological:      Mental Status: She is alert and oriented to person, place, and time.      Deep Tendon Reflexes: Reflexes normal.      Comments: Bilateral lower extremity strength and sensory intact.  Negative straight leg raise.                               Assessment & Plan       X-ray per radiology:  Moderate left, mild right hip osteoarthritis      1. Left hip pain  DX-HIP-COMPLETE - UNILATERAL 2+ " LEFT   2. Lumbar radiculopathy  predniSONE (DELTASONE) 20 MG Tab    gabapentin (NEURONTIN) 300 MG Cap    REFERRAL TO PHYSICAL THERAPY   3. Osteoarthritis of left hip, unspecified osteoarthritis type  REFERRAL TO ORTHOPEDICS    REFERRAL TO PHYSICAL THERAPY     Differential diagnosis, natural history, supportive care, and indications for immediate follow-up discussed at length.     Difficult to determine if pain is being generated by hip OA alone or radicular component.  I suspect there is a radicular component so will try short course of corticosteroid, gabapentin at night.  Initiate physical therapy and refer to orthopedic surgery.    I would like her to make an appointment to follow-up with primary care

## 2021-09-11 NOTE — LETTER
September 11, 2021         Patient: Alessia Middleton   YOB: 1950   Date of Visit: 9/11/2021           To Whom it May Concern:    Alessia Middleton was seen in my clinic on 9/11/2021. Please excuse from work 9/13 through 9/15/2021.       Sincerely,           Heriberto Uriostegui M.D.  Electronically Signed

## 2022-10-14 NOTE — TELEPHONE ENCOUNTER
When Pt calls back, please be sure she receives all 3 results in previous messages below.    Discharged

## 2023-05-25 NOTE — TELEPHONE ENCOUNTER
-- DO NOT REPLY / DO NOT REPLY ALL --  -- Message is from [facility] [employer] ([facility]) --    General Patient Message: patient missed call regarding results, please try back.     Caller Information       Type Contact Phone/Fax    05/25/2023 10:37 AM CDT Phone (Outgoing) Deonna Healy (Self) [phone] (H)    Left Message         Alternative phone number: [phone]    Can a detailed message be left? Yes    Message Turnaround:     Is it Working Hours? Yes - Working Hours     [place]:    Please give this turnaround time to the caller:   \"This message will be sent to [state Provider's name]. The clinical team will fulfill your request as soon as they review your message.\"                 Phone Number Called: [phone] (home) [phone] (work)    Call outcome: 3rd attempt, letter mailed to junaid

## 2023-07-10 ENCOUNTER — TELEPHONE (OUTPATIENT)
Dept: HEALTH INFORMATION MANAGEMENT | Facility: OTHER | Age: 73
End: 2023-07-10
Payer: COMMERCIAL

## 2024-01-25 ENCOUNTER — APPOINTMENT (RX ONLY)
Dept: URBAN - METROPOLITAN AREA CLINIC 31 | Facility: CLINIC | Age: 74
Setting detail: DERMATOLOGY
End: 2024-01-25

## 2024-01-25 DIAGNOSIS — L72.0 EPIDERMAL CYST: ICD-10-CM

## 2024-01-25 PROCEDURE — ? PRESCRIPTION

## 2024-01-25 PROCEDURE — ? MEDICATION COUNSELING

## 2024-01-25 PROCEDURE — 99203 OFFICE O/P NEW LOW 30 MIN: CPT

## 2024-01-25 PROCEDURE — ? COUNSELING

## 2024-01-25 RX ORDER — DOXYCYCLINE HYCLATE 100 MG/1
CAPSULE, GELATIN COATED ORAL
Qty: 60 | Refills: 0 | Status: ERX | COMMUNITY
Start: 2024-01-25

## 2024-01-25 RX ADMIN — DOXYCYCLINE HYCLATE: 100 CAPSULE, GELATIN COATED ORAL at 00:00

## 2024-01-25 NOTE — PROCEDURE: MEDICATION COUNSELING
Isotretinoin Counseling: Patient should get monthly blood tests, not donate blood, not drive at night if vision affected, not share medication, and not undergo elective surgery for 6 months after tx completed. Side effects reviewed, pt to contact office should one occur.
Cephalexin Counseling: I counseled the patient regarding use of cephalexin as an antibiotic for prophylactic and/or therapeutic purposes. Cephalexin (commonly prescribed under brand name Keflex) is a cephalosporin antibiotic which is active against numerous classes of bacteria, including most skin bacteria. Side effects may include nausea, diarrhea, gastrointestinal upset, rash, hives, yeast infections, and in rare cases, hepatitis, kidney disease, seizures, fever, confusion, neurologic symptoms, and others. Patients with severe allergies to penicillin medications are cautioned that there is about a 10% incidence of cross-reactivity with cephalosporins. When possible, patients with penicillin allergies should use alternatives to cephalosporins for antibiotic therapy.
Tetracycline Counseling: Patient counseled regarding possible photosensitivity and increased risk for sunburn.  Patient instructed to avoid sunlight, if possible.  When exposed to sunlight, patients should wear protective clothing, sunglasses, and sunscreen.  The patient was instructed to call the office immediately if the following severe adverse effects occur:  hearing changes, easy bruising/bleeding, severe headache, or vision changes.  The patient verbalized understanding of the proper use and possible adverse effects of tetracycline.  All of the patient's questions and concerns were addressed. Patient understands to avoid pregnancy while on therapy due to potential birth defects.
Opioid Pregnancy And Lactation Text: These medications can lead to premature delivery and should be avoided during pregnancy. These medications are also present in breast milk in small amounts.
Hydroxyzine Pregnancy And Lactation Text: This medication is not safe during pregnancy and should not be taken. It is also excreted in breast milk and breast feeding isn't recommended.
Bimzelx Pregnancy And Lactation Text: This medication crosses the placenta and the safety is uncertain during pregnancy. It is unknown if this medication is present in breast milk.
Humira Counseling:  I discussed with the patient the risks of adalimumab including but not limited to myelosuppression, immunosuppression, autoimmune hepatitis, demyelinating diseases, lymphoma, and serious infections.  The patient understands that monitoring is required including a PPD at baseline and must alert us or the primary physician if symptoms of infection or other concerning signs are noted.
Vtama Pregnancy And Lactation Text: It is unknown if this medication can cause problems during pregnancy and breastfeeding.
Prednisone Pregnancy And Lactation Text: This medication is Pregnancy Category C and it isn't know if it is safe during pregnancy. This medication is excreted in breast milk.
Elidel Pregnancy And Lactation Text: This medication is Pregnancy Category C. It is unknown if this medication is excreted in breast milk.
Minoxidil Counseling: Minoxidil is a topical medication which can increase blood flow where it is applied. It is uncertain how this medication increases hair growth. Side effects are uncommon and include stinging and allergic reactions.
Cellcept Counseling:  I discussed with the patient the risks of mycophenolate mofetil including but not limited to infection/immunosuppression, GI upset, hypokalemia, hypercholesterolemia, bone marrow suppression, lymphoproliferative disorders, malignancy, GI ulceration/bleed/perforation, colitis, interstitial lung disease, kidney failure, progressive multifocal leukoencephalopathy, and birth defects.  The patient understands that monitoring is required including a baseline creatinine and regular CBC testing. In addition, patient must alert us immediately if symptoms of infection or other concerning signs are noted.
Metronidazole Pregnancy And Lactation Text: This medication is Pregnancy Category B and considered safe during pregnancy.  It is also excreted in breast milk.
Finasteride Female Counseling: Finasteride Counseling:  I discussed with the patient the risks of use of finasteride including but not limited to decreased libido and sexual dysfunction. Explained the teratogenic nature of the medication and stressed the importance of not getting pregnant during treatment. All of the patient's questions and concerns were addressed.
Ketoconazole Pregnancy And Lactation Text: This medication is Pregnancy Category C and it isn't know if it is safe during pregnancy. It is also excreted in breast milk and breast feeding isn't recommended.
Qbrexza Counseling:  I discussed with the patient the risks of Qbrexza including but not limited to headache, mydriasis, blurred vision, dry eyes, nasal dryness, dry mouth, dry throat, dry skin, urinary hesitation, and constipation.  Local skin reactions including erythema, burning, stinging, and itching can also occur.
Taltz Counseling: I discussed with the patient the risks of ixekizumab including but not limited to immunosuppression, serious infections, worsening of inflammatory bowel disease and drug reactions.  The patient understands that monitoring is required including a PPD at baseline and must alert us or the primary physician if symptoms of infection or other concerning signs are noted.
Eucrisa Counseling: Patient may experience a mild burning sensation during topical application. Eucrisa is not approved in children less than 2 years of age.
Minoxidil Pregnancy And Lactation Text: This medication has not been assigned a Pregnancy Risk Category but animal studies failed to show danger with the topical medication. It is unknown if the medication is excreted in breast milk.
Bactrim Counseling:  I discussed with the patient the risks of sulfa antibiotics including but not limited to GI upset, allergic reaction, drug rash, diarrhea, dizziness, photosensitivity, and yeast infections.  Rarely, more serious reactions can occur including but not limited to aplastic anemia, agranulocytosis, methemoglobinemia, blood dyscrasias, liver or kidney failure, lung infiltrates or desquamative/blistering drug rashes.
Libtayo Counseling- I discussed with the patient the risks of Libtayo including but not limited to nausea, vomiting, diarrhea, and bone or muscle pain.  The patient verbalized understanding of the proper use and possible adverse effects of Libtayo.  All of the patient's questions and concerns were addressed.
Gabapentin Counseling: I discussed with the patient the risks of gabapentin including but not limited to dizziness, somnolence, fatigue and ataxia.
Topical Steroids Counseling: I discussed with the patient that prolonged use of topical steroids can result in the increased appearance of superficial blood vessels (telangiectasias), lightening (hypopigmentation) and thinning of the skin (atrophy).  Patient understands to avoid using high potency steroids in skin folds, the groin or the face.  The patient verbalized understanding of the proper use and possible adverse effects of topical steroids.  All of the patient's questions and concerns were addressed.
Sotyktu Counseling:  I discussed the most common side effects of Sotyktu including: common cold, sore throat, sinus infections, cold sores, canker sores, folliculitis, and acne.  I also discussed more serious side effects of Sotyktu including but not limited to: serious allergic reactions; increased risk for infections such as TB; cancers such as lymphomas; rhabdomyolysis and elevated CPK; and elevated triglycerides and liver enzymes. 
Tranexamic Acid Pregnancy And Lactation Text: It is unknown if this medication is safe during pregnancy or breast feeding.
Carac Pregnancy And Lactation Text: This medication is Pregnancy Category X and contraindicated in pregnancy and in women who may become pregnant. It is unknown if this medication is excreted in breast milk.
Niacinamide Pregnancy And Lactation Text: These medications are considered safe during pregnancy.
Oxybutynin Counseling:  I discussed with the patient the risks of oxybutynin including but not limited to skin rash, drowsiness, dry mouth, difficulty urinating, and blurred vision.
Isotretinoin Pregnancy And Lactation Text: This medication is Pregnancy Category X and is considered extremely dangerous during pregnancy. It is unknown if it is excreted in breast milk.
Humira Pregnancy And Lactation Text: This medication is Pregnancy Category B and is considered safe during pregnancy. It is unknown if this medication is excreted in breast milk.
Zoryve Counseling:  I discussed with the patient that Zoryve is not for use in the eyes, mouth or vagina. The most commonly reported side effects include diarrhea, headache, insomnia, application site pain, upper respiratory tract infections, and urinary tract infections.  All of the patient's questions and concerns were addressed.
Libtayo Pregnancy And Lactation Text: This medication is contraindicated in pregnancy and when breast feeding.
Minocycline Counseling: Patient advised regarding possible photosensitivity and discoloration of the teeth, skin, lips, tongue and gums.  Patient instructed to avoid sunlight, if possible.  When exposed to sunlight, patients should wear protective clothing, sunglasses, and sunscreen.  The patient was instructed to call the office immediately if the following severe adverse effects occur:  hearing changes, easy bruising/bleeding, severe headache, or vision changes.  The patient verbalized understanding of the proper use and possible adverse effects of minocycline.  All of the patient's questions and concerns were addressed.
Oxybutynin Pregnancy And Lactation Text: This medication is Pregnancy Category B and is considered safe during pregnancy. It is unknown if it is excreted in breast milk.
Cimzia Counseling:  I discussed with the patient the risks of Cimzia including but not limited to immunosuppression, allergic reactions and infections.  The patient understands that monitoring is required including a PPD at baseline and must alert us or the primary physician if symptoms of infection or other concerning signs are noted.
Nsaids Counseling: NSAID Counseling: I discussed with the patient that NSAIDs should be taken with food. Prolonged use of NSAIDs can result in the development of stomach ulcers.  Patient advised to stop taking NSAIDs if abdominal pain occurs.  The patient verbalized understanding of the proper use and possible adverse effects of NSAIDs.  All of the patient's questions and concerns were addressed.
Topical Steroids Applications Pregnancy And Lactation Text: Most topical steroids are considered safe to use during pregnancy and lactation.  Any topical steroid applied to the breast or nipple should be washed off before breastfeeding.
Cellcept Pregnancy And Lactation Text: This medication is Pregnancy Category D and isn't considered safe during pregnancy. It is unknown if this medication is excreted in breast milk.
Tetracycline Pregnancy And Lactation Text: This medication is Pregnancy Category D and not consider safe during pregnancy. It is also excreted in breast milk.
Finasteride Pregnancy And Lactation Text: This medication is absolutely contraindicated during pregnancy. It is unknown if it is excreted in breast milk.
Cephalexin Pregnancy And Lactation Text: This medication is Pregnancy Category B and considered safe during pregnancy.  It is also excreted in breast milk but can be used safely for shorter doses.
Taltz Pregnancy And Lactation Text: The risk during pregnancy and breastfeeding is uncertain with this medication.
Terbinafine Counseling: Patient counseling regarding adverse effects of terbinafine including but not limited to headache, diarrhea, rash, upset stomach, liver function test abnormalities, itching, taste/smell disturbance, nausea, abdominal pain, and flatulence.  There is a rare possibility of liver failure that can occur when taking terbinafine.  The patient understands that a baseline LFT and kidney function test may be required. The patient verbalized understanding of the proper use and possible adverse effects of terbinafine.  All of the patient's questions and concerns were addressed.
Calcipotriene Counseling:  I discussed with the patient the risks of calcipotriene including but not limited to erythema, scaling, itching, and irritation.
Albendazole Counseling:  I discussed with the patient the risks of albendazole including but not limited to cytopenia, kidney damage, nausea/vomiting and severe allergy.  The patient understands that this medication is being used in an off-label manner.
Cyclophosphamide Counseling:  I discussed with the patient the risks of cyclophosphamide including but not limited to hair loss, hormonal abnormalities, decreased fertility, abdominal pain, diarrhea, nausea and vomiting, bone marrow suppression and infection. The patient understands that monitoring is required while taking this medication.
Gabapentin Pregnancy And Lactation Text: This medication is Pregnancy Category C and isn't considered safe during pregnancy. It is excreted in breast milk.
Cibinqo Counseling: I discussed with the patient the risks of Cibinqo therapy including but not limited to common cold, nausea, headache, cold sores, increased blood CPK levels, dizziness, UTIs, fatigue, acne, and vomitting. Live vaccines should be avoided.  This medication has been linked to serious infections; higher rate of mortality; malignancy and lymphoproliferative disorders; major adverse cardiovascular events; thrombosis; thrombocytopenia and lymphopenia; lipid elevations; and retinal detachment.
Qbrexza Pregnancy And Lactation Text: There is no available data on Qbrexza use in pregnant women.  There is no available data on Qbrexza use in lactation.
Sotyktu Pregnancy And Lactation Text: There is insufficient data to evaluate whether or not Sotyktu is safe to use during pregnancy.   It is not known if Sotyktu passes into breast milk and whether or not it is safe to use when breastfeeding.  
Arava Counseling:  Patient counseled regarding adverse effects of Arava including but not limited to nausea, vomiting, abnormalities in liver function tests. Patients may develop mouth sores, rash, diarrhea, and abnormalities in blood counts. The patient understands that monitoring is required including LFTs and blood counts.  There is a rare possibility of scarring of the liver and lung problems that can occur when taking methotrexate. Persistent nausea, loss of appetite, pale stools, dark urine, cough, and shortness of breath should be reported immediately. Patient advised to discontinue Arava treatment and consult with a physician prior to attempting conception. The patient will have to undergo a treatment to eliminate Arava from the body prior to conception.
Tazorac Counseling:  Patient advised that medication is irritating and drying.  Patient may need to apply sparingly and wash off after an hour before eventually leaving it on overnight.  The patient verbalized understanding of the proper use and possible adverse effects of tazorac.  All of the patient's questions and concerns were addressed.
Valtrex Counseling: I discussed with the patient the risks of valacyclovir including but not limited to kidney damage, nausea, vomiting and severe allergy.  The patient understands that if the infection seems to be worsening or is not improving, they are to call.
Mirvaso Counseling: Mirvaso is a topical medication which can decrease superficial blood flow where applied. Side effects are uncommon and include stinging, redness and allergic reactions.
Cimzia Pregnancy And Lactation Text: This medication crosses the placenta but can be considered safe in certain situations. Cimzia may be excreted in breast milk.
Propranolol Counseling:  I discussed with the patient the risks of propranolol including but not limited to low heart rate, low blood pressure, low blood sugar, restlessness and increased cold sensitivity. They should call the office if they experience any of these side effects.
Topical Sulfur Applications Counseling: Topical Sulfur Counseling: Patient counseled that this medication may cause skin irritation or allergic reactions.  In the event of skin irritation, the patient was advised to reduce the amount of the drug applied or use it less frequently.   The patient verbalized understanding of the proper use and possible adverse effects of topical sulfur application.  All of the patient's questions and concerns were addressed.
Cibinqo Pregnancy And Lactation Text: It is unknown if this medication will adversely affect pregnancy or breast feeding.  You should not take this medication if you are currently pregnant or planning a pregnancy or while breastfeeding.
Nsaids Pregnancy And Lactation Text: These medications are considered safe up to 30 weeks gestation. It is excreted in breast milk.
Odomzo Counseling- I discussed with the patient the risks of Odomzo including but not limited to nausea, vomiting, diarrhea, constipation, weight loss, changes in the sense of taste, decreased appetite, muscle spasms, and hair loss.  The patient verbalized understanding of the proper use and possible adverse effects of Odomzo.  All of the patient's questions and concerns were addressed.
Clindamycin Counseling: I counseled the patient regarding use of clindamycin as an antibiotic for prophylactic and/or therapeutic purposes. Clindamycin is active against numerous classes of bacteria, including skin bacteria. Side effects may include nausea, diarrhea, gastrointestinal upset, rash, hives, yeast infections, and in rare cases, colitis.
Arava Pregnancy And Lactation Text: This medication is Pregnancy Category X and is absolutely contraindicated during pregnancy. It is unknown if it is excreted in breast milk.
Tazorac Pregnancy And Lactation Text: This medication is not safe during pregnancy. It is unknown if this medication is excreted in breast milk.
Glycopyrrolate Counseling:  I discussed with the patient the risks of glycopyrrolate including but not limited to skin rash, drowsiness, dry mouth, difficulty urinating, and blurred vision.
Birth Control Pills Counseling: Birth Control Pill Counseling: I discussed with the patient the potential side effects of OCPs including but not limited to increased risk of stroke, heart attack, thrombophlebitis, deep venous thrombosis, hepatic adenomas, breast changes, GI upset, headaches, and depression.  The patient verbalized understanding of the proper use and possible adverse effects of OCPs. All of the patient's questions and concerns were addressed.
Aklief counseling:  Patient advised to apply a pea-sized amount only at bedtime and wait 30 minutes after washing their face before applying.  If too drying, patient may add a non-comedogenic moisturizer.  The most commonly reported side effects including irritation, redness, scaling, dryness, stinging, burning, itching, and increased risk of sunburn.  The patient verbalized understanding of the proper use and possible adverse effects of retinoids.  All of the patient's questions and concerns were addressed.
Ilumya Counseling: I discussed with the patient the risks of tildrakizumab including but not limited to immunosuppression, malignancy, posterior leukoencephalopathy syndrome, and serious infections.  The patient understands that monitoring is required including a PPD at baseline and must alert us or the primary physician if symptoms of infection or other concerning signs are noted.
High Dose Vitamin A Counseling: Side effects reviewed, pt to contact office should one occur.
Calcipotriene Pregnancy And Lactation Text: The use of this medication during pregnancy or lactation is not recommended as there is insufficient data.
Cantharidin Counseling:  I discussed with the patient the risks of Cantharidin including but not limited to pain, redness, burning, itching, and blistering.
Xeljanz Counseling: I discussed with the patient the risks of Xeljanz therapy including increased risk of infection, liver issues, headache, diarrhea, or cold symptoms. Live vaccines should be avoided. They were instructed to call if they have any problems.
Quinolones Counseling:  I discussed with the patient the risks of fluoroquinolones including but not limited to GI upset, allergic reaction, drug rash, diarrhea, dizziness, photosensitivity, yeast infections, liver function test abnormalities, tendonitis/tendon rupture.
Detail Level: Detailed
Mirvaso Pregnancy And Lactation Text: This medication has not been assigned a Pregnancy Risk Category. It is unknown if the medication is excreted in breast milk.
Fluconazole Counseling:  Patient counseled regarding adverse effects of fluconazole including but not limited to headache, diarrhea, nausea, upset stomach, liver function test abnormalities, taste disturbance, and stomach pain.  There is a rare possibility of liver failure that can occur when taking fluconazole.  The patient understands that monitoring of LFTs and kidney function test may be required, especially at baseline. The patient verbalized understanding of the proper use and possible adverse effects of fluconazole.  All of the patient's questions and concerns were addressed.
Clindamycin Pregnancy And Lactation Text: This medication can be used in pregnancy if certain situations. Clindamycin is also present in breast milk.
Tremfya Counseling: I discussed with the patient the risks of guselkumab including but not limited to immunosuppression, serious infections, and drug reactions.  The patient understands that monitoring is required including a PPD at baseline and must alert us or the primary physician if symptoms of infection or other concerning signs are noted.
Rhofade Counseling: Rhofade is a topical medication which can decrease superficial blood flow where applied. Side effects are uncommon and include stinging, redness and allergic reactions.
Valtrex Pregnancy And Lactation Text: this medication is Pregnancy Category B and is considered safe during pregnancy. This medication is not directly found in breast milk but it's metabolite acyclovir is present.
Terbinafine Pregnancy And Lactation Text: This medication is Pregnancy Category B and is considered safe during pregnancy. It is also excreted in breast milk and breast feeding isn't recommended.
Albendazole Pregnancy And Lactation Text: This medication is Pregnancy Category C and it isn't known if it is safe during pregnancy. It is also excreted in breast milk.
Cyclophosphamide Pregnancy And Lactation Text: This medication is Pregnancy Category D and it isn't considered safe during pregnancy. This medication is excreted in breast milk.
Hydroquinone Counseling:  Patient advised that medication may result in skin irritation, lightening (hypopigmentation), dryness, and burning.  In the event of skin irritation, the patient was advised to reduce the amount of the drug applied or use it less frequently.  Rarely, spots that are treated with hydroquinone can become darker (pseudoochronosis).  Should this occur, patient instructed to stop medication and call the office. The patient verbalized understanding of the proper use and possible adverse effects of hydroquinone.  All of the patient's questions and concerns were addressed.
Litfulo Counseling: I discussed with the patient the risks of Litfulo therapy including but not limited to upper respiratory tract infections, shingles, cold sores, and nausea. Live vaccines should be avoided.  This medication has been linked to serious infections; higher rate of mortality; malignancy and lymphoproliferative disorders; major adverse cardiovascular events; thrombosis; gastrointestinal perforations; neutropenia; lymphopenia; anemia; liver enzyme elevations; and lipid elevations.
Glycopyrrolate Pregnancy And Lactation Text: This medication is Pregnancy Category B and is considered safe during pregnancy. It is unknown if it is excreted breast milk.
Propranolol Pregnancy And Lactation Text: This medication is Pregnancy Category C and it isn't known if it is safe during pregnancy. It is excreted in breast milk.
Topical Clindamycin Counseling: Patient counseled that this medication may cause skin irritation or allergic reactions.  In the event of skin irritation, the patient was advised to reduce the amount of the drug applied or use it less frequently.   The patient verbalized understanding of the proper use and possible adverse effects of clindamycin.  All of the patient's questions and concerns were addressed.
Cantharidin Pregnancy And Lactation Text: This medication has not been proven safe during pregnancy. It is unknown if this medication is excreted in breast milk.
Fluconazole Pregnancy And Lactation Text: This medication is Pregnancy Category C and it isn't know if it is safe during pregnancy. It is also excreted in breast milk.
Clofazimine Counseling:  I discussed with the patient the risks of clofazimine including but not limited to skin and eye pigmentation, liver damage, nausea/vomiting, gastrointestinal bleeding and allergy.
Simponi Counseling:  I discussed with the patient the risks of golimumab including but not limited to myelosuppression, immunosuppression, autoimmune hepatitis, demyelinating diseases, lymphoma, and serious infections.  The patient understands that monitoring is required including a PPD at baseline and must alert us or the primary physician if symptoms of infection or other concerning signs are noted.
Xelshayz Pregnancy And Lactation Text: This medication is Pregnancy Category D and is not considered safe during pregnancy.  The risk during breast feeding is also uncertain.
Ivermectin Counseling:  Patient instructed to take medication on an empty stomach with a full glass of water.  Patient informed of potential adverse effects including but not limited to nausea, diarrhea, dizziness, itching, and swelling of the extremities or lymph nodes.  The patient verbalized understanding of the proper use and possible adverse effects of ivermectin.  All of the patient's questions and concerns were addressed.
High Dose Vitamin A Pregnancy And Lactation Text: High dose vitamin A therapy is contraindicated during pregnancy and breast feeding.
Olanzapine Counseling- I discussed with the patient the common side effects of olanzapine including but are not limited to: lack of energy, dry mouth, increased appetite, sleepiness, tremor, constipation, dizziness, changes in behavior, or restlessness.  Explained that teenagers are more likely to experience headaches, abdominal pain, pain in the arms or legs, tiredness, and sleepiness.  Serious side effects include but are not limited: increased risk of death in elderly patients who are confused, have memory loss, or dementia-related psychosis; hyperglycemia; increased cholesterol and triglycerides; and weight gain.
Birth Control Pills Pregnancy And Lactation Text: This medication should be avoided if pregnant and for the first 30 days post-partum.
Cosentyx Counseling:  I discussed with the patient the risks of Cosentyx including but not limited to worsening of Crohn's disease, immunosuppression, allergic reactions and infections.  The patient understands that monitoring is required including a PPD at baseline and must alert us or the primary physician if symptoms of infection or other concerning signs are noted.
Topical Sulfur Applications Pregnancy And Lactation Text: This medication is Pregnancy Category C and has an unknown safety profile during pregnancy. It is unknown if this topical medication is excreted in breast milk.
Aklief Pregnancy And Lactation Text: It is unknown if this medication is safe to use during pregnancy.  It is unknown if this medication is excreted in breast milk.  Breastfeeding women should use the topical cream on the smallest area of the skin for the shortest time needed while breastfeeding.  Do not apply to nipple and areola.
5-Fu Counseling: 5-Fluorouracil Counseling:  I discussed with the patient the risks of 5-fluorouracil including but not limited to erythema, scaling, itching, weeping, crusting, and pain.
Spironolactone Counseling: Patient advised regarding risks of diarrhea, abdominal pain, hyperkalemia, birth defects (for female patients), liver toxicity and renal toxicity. The patient may need blood work to monitor liver and kidney function and potassium levels while on therapy. The patient verbalized understanding of the proper use and possible adverse effects of spironolactone.  All of the patient's questions and concerns were addressed.
Infliximab Counseling:  I discussed with the patient the risks of infliximab including but not limited to myelosuppression, immunosuppression, autoimmune hepatitis, demyelinating diseases, lymphoma, and serious infections.  The patient understands that monitoring is required including a PPD at baseline and must alert us or the primary physician if symptoms of infection or other concerning signs are noted.
Use Enhanced Medication Counseling?: No
Doxycycline Counseling:  Patient counseled regarding possible photosensitivity and increased risk for sunburn.  Patient instructed to avoid sunlight, if possible.  When exposed to sunlight, patients should wear protective clothing, sunglasses, and sunscreen.  The patient was instructed to call the office immediately if the following severe adverse effects occur:  hearing changes, easy bruising/bleeding, severe headache, or vision changes.  The patient verbalized understanding of the proper use and possible adverse effects of doxycycline.  All of the patient's questions and concerns were addressed.
Opzelura Counseling:  I discussed with the patient the risks of Opzelura including but not limited to nasopharngitis, bronchitis, ear infection, eosinophila, hives, diarrhea, folliculitis, tonsillitis, and rhinorrhea.  Taken orally, this medication has been linked to serious infections; higher rate of mortality; malignancy and lymphoproliferative disorders; major adverse cardiovascular events; thrombosis; thrombocytopenia, anemia, and neutropenia; and lipid elevations.
Cyclosporine Counseling:  I discussed with the patient the risks of cyclosporine including but not limited to hypertension, gingival hyperplasia,myelosuppression, immunosuppression, liver damage, kidney damage, neurotoxicity, lymphoma, and serious infections. The patient understands that monitoring is required including baseline blood pressure, CBC, CMP, lipid panel and uric acid, and then 1-2 times monthly CMP and blood pressure.
Solaraze Counseling:  I discussed with the patient the risks of Solaraze including but not limited to erythema, scaling, itching, weeping, crusting, and pain.
Griseofulvin Counseling:  I discussed with the patient the risks of griseofulvin including but not limited to photosensitivity, cytopenia, liver damage, nausea/vomiting and severe allergy.  The patient understands that this medication is best absorbed when taken with a fatty meal (e.g., ice cream or french fries).
Opzelura Pregnancy And Lactation Text: There is insufficient data to evaluate drug-associated risk for major birth defects, miscarriage, or other adverse maternal or fetal outcomes.  There is a pregnancy registry that monitors pregnancy outcomes in pregnant persons exposed to the medication during pregnancy.  It is unknown if this medication is excreted in breast milk.  Do not breastfeed during treatment and for about 4 weeks after the last dose.
Xolair Counseling:  Patient informed of potential adverse effects including but not limited to fever, muscle aches, rash and allergic reactions.  The patient verbalized understanding of the proper use and possible adverse effects of Xolair.  All of the patient's questions and concerns were addressed.
Cimetidine Counseling:  I discussed with the patient the risks of Cimetidine including but not limited to gynecomastia, headache, diarrhea, nausea, drowsiness, arrhythmias, pancreatitis, skin rashes, psychosis, bone marrow suppression and kidney toxicity.
Hydroxychloroquine Counseling:  I discussed with the patient that a baseline ophthalmologic exam is needed at the start of therapy and every year thereafter while on therapy. A CBC may also be warranted for monitoring.  The side effects of this medication were discussed with the patient, including but not limited to agranulocytosis, aplastic anemia, seizures, rashes, retinopathy, and liver toxicity. Patient instructed to call the office should any adverse effect occur.  The patient verbalized understanding of the proper use and possible adverse effects of Plaquenil.  All the patient's questions and concerns were addressed.
Olanzapine Pregnancy And Lactation Text: This medication is pregnancy category C.   There are no adequate and well controlled trials with olanzapine in pregnant females.  Olanzapine should be used during pregnancy only if the potential benefit justifies the potential risk to the fetus.   In a study in lactating healthy women, olanzapine was excreted in breast milk.  It is recommended that women taking olanzapine should not breast feed.
Wartpeel Counseling:  I discussed with the patient the risks of Wartpeel including but not limited to erythema, scaling, itching, weeping, crusting, and pain.
Azelaic Acid Counseling: Patient counseled that medicine may cause skin irritation and to avoid applying near the eyes.  In the event of skin irritation, the patient was advised to reduce the amount of the drug applied or use it less frequently.   The patient verbalized understanding of the proper use and possible adverse effects of azelaic acid.  All of the patient's questions and concerns were addressed.
Litfulo Pregnancy And Lactation Text: Based on animal studies, Lifulo may cause embryo-fetal harm when administered to pregnant women.  The medication should not be used in pregnancy.  Breastfeeding is not recommended during treatment.
SSKI Counseling:  I discussed with the patient the risks of SSKI including but not limited to thyroid abnormalities, metallic taste, GI upset, fever, headache, acne, arthralgias, paraesthesias, lymphadenopathy, easy bleeding, arrhythmias, and allergic reaction.
Acitretin Counseling:  I discussed with the patient the risks of acitretin including but not limited to hair loss, dry lips/skin/eyes, liver damage, hyperlipidemia, depression/suicidal ideation, photosensitivity.  Serious rare side effects can include but are not limited to pancreatitis, pseudotumor cerebri, bony changes, clot formation/stroke/heart attack.  Patient understands that alcohol is contraindicated since it can result in liver toxicity and significantly prolong the elimination of the drug by many years.
Rifampin Counseling: I discussed with the patient the risks of rifampin including but not limited to liver damage, kidney damage, red-orange body fluids, nausea/vomiting and severe allergy.
Spironolactone Pregnancy And Lactation Text: This medication can cause feminization of the male fetus and should be avoided during pregnancy. The active metabolite is also found in breast milk.
Dupixent Counseling: I discussed with the patient the risks of dupilumab including but not limited to eye infection and irritation, cold sores, injection site reactions, worsening of asthma, allergic reactions and increased risk of parasitic infection.  Live vaccines should be avoided while taking dupilumab. Dupilumab will also interact with certain medications such as warfarin and cyclosporine. The patient understands that monitoring is required and they must alert us or the primary physician if symptoms of infection or other concerning signs are noted.
Oral Minoxidil Counseling- I discussed with the patient the risks of oral minoxidil including but not limited to shortness of breath, swelling of the feet or ankles, dizziness, lightheadedness, unwanted hair growth and allergic reaction.  The patient verbalized understanding of the proper use and possible adverse effects of oral minoxidil.  All of the patient's questions and concerns were addressed.
Imiquimod Counseling:  I discussed with the patient the risks of imiquimod including but not limited to erythema, scaling, itching, weeping, crusting, and pain.  Patient understands that the inflammatory response to imiquimod is variable from person to person and was educated regarded proper titration schedule.  If flu-like symptoms develop, patient knows to discontinue the medication and contact us.
Doxycycline Pregnancy And Lactation Text: This medication is Pregnancy Category D and not consider safe during pregnancy. It is also excreted in breast milk but is considered safe for shorter treatment courses.
Dutasteride Male Counseling: Dustasteride Counseling:  I discussed with the patient the risks of use of dutasteride including but not limited to decreased libido, decreased ejaculate volume, and gynecomastia. Women who can become pregnant should not handle medication.  All of the patient's questions and concerns were addressed.
Griseofulvin Pregnancy And Lactation Text: This medication is Pregnancy Category X and is known to cause serious birth defects. It is unknown if this medication is excreted in breast milk but breast feeding should be avoided.
Picato Counseling:  I discussed with the patient the risks of Picato including but not limited to erythema, scaling, itching, weeping, crusting, and pain.
Xolair Pregnancy And Lactation Text: This medication is Pregnancy Category B and is considered safe during pregnancy. This medication is excreted in breast milk.
Skyrizi Counseling: I discussed with the patient the risks of risankizumab-rzaa including but not limited to immunosuppression, and serious infections.  The patient understands that monitoring is required including a PPD at baseline and must alert us or the primary physician if symptoms of infection or other concerning signs are noted.
Azelaic Acid Pregnancy And Lactation Text: This medication is considered safe during pregnancy and breast feeding.
Drysol Counseling:  I discussed with the patient the risks of drysol/aluminum chloride including but not limited to skin rash, itching, irritation, burning.
Methotrexate Counseling:  Patient counseled regarding adverse effects of methotrexate including but not limited to nausea, vomiting, abnormalities in liver function tests. Patients may develop mouth sores, rash, diarrhea, and abnormalities in blood counts. The patient understands that monitoring is required including LFT's and blood counts.  There is a rare possibility of scarring of the liver and lung problems that can occur when taking methotrexate. Persistent nausea, loss of appetite, pale stools, dark urine, cough, and shortness of breath should be reported immediately. Patient advised to discontinue methotrexate treatment at least three months before attempting to become pregnant.  I discussed the need for folate supplements while taking methotrexate.  These supplements can decrease side effects during methotrexate treatment. The patient verbalized understanding of the proper use and possible adverse effects of methotrexate.  All of the patient's questions and concerns were addressed.
Olumiant Counseling: I discussed with the patient the risks of Olumiant therapy including but not limited to upper respiratory tract infections, shingles, cold sores, and nausea. Live vaccines should be avoided.  This medication has been linked to serious infections; higher rate of mortality; malignancy and lymphoproliferative disorders; major adverse cardiovascular events; thrombosis; gastrointestinal perforations; neutropenia; lymphopenia; anemia; liver enzyme elevations; and lipid elevations.
Hydroxychloroquine Pregnancy And Lactation Text: This medication has been shown to cause fetal harm but it isn't assigned a Pregnancy Risk Category. There are small amounts excreted in breast milk.
Colchicine Counseling:  Patient counseled regarding adverse effects including but not limited to stomach upset (nausea, vomiting, stomach pain, or diarrhea).  Patient instructed to limit alcohol consumption while taking this medication.  Colchicine may reduce blood counts especially with prolonged use.  The patient understands that monitoring of kidney function and blood counts may be required, especially at baseline. The patient verbalized understanding of the proper use and possible adverse effects of colchicine.  All of the patient's questions and concerns were addressed.
Solaraze Pregnancy And Lactation Text: This medication is Pregnancy Category B and is considered safe. There is some data to suggest avoiding during the third trimester. It is unknown if this medication is excreted in breast milk.
Sski Pregnancy And Lactation Text: This medication is Pregnancy Category D and isn't considered safe during pregnancy. It is excreted in breast milk.
Topical Ketoconazole Counseling: Patient counseled that this medication may cause skin irritation or allergic reactions.  In the event of skin irritation, the patient was advised to reduce the amount of the drug applied or use it less frequently.   The patient verbalized understanding of the proper use and possible adverse effects of ketoconazole.  All of the patient's questions and concerns were addressed.
Zyclara Counseling:  I discussed with the patient the risks of imiquimod including but not limited to erythema, scaling, itching, weeping, crusting, and pain.  Patient understands that the inflammatory response to imiquimod is variable from person to person and was educated regarded proper titration schedule.  If flu-like symptoms develop, patient knows to discontinue the medication and contact us.
Oral Minoxidil Pregnancy And Lactation Text: This medication should only be used when clearly needed if you are pregnant, attempting to become pregnant or breast feeding.
Winlevi Counseling:  I discussed with the patient the risks of topical clascoterone including but not limited to erythema, scaling, itching, and stinging. Patient voiced their understanding.
Dupixent Pregnancy And Lactation Text: This medication likely crosses the placenta but the risk for the fetus is uncertain. This medication is excreted in breast milk.
Rituxan Counseling:  I discussed with the patient the risks of Rituxan infusions. Side effects can include infusion reactions, severe drug rashes including mucocutaneous reactions, reactivation of latent hepatitis and other infections and rarely progressive multifocal leukoencephalopathy.  All of the patient's questions and concerns were addressed.
Erythromycin Counseling:  I discussed with the patient the risks of erythromycin including but not limited to GI upset, allergic reaction, drug rash, diarrhea, increase in liver enzymes, and yeast infections.
Low Dose Naltrexone Counseling- I discussed with the patient the potential risks and side effects of low dose naltrexone including but not limited to: more vivid dreams, headaches, nausea, vomiting, abdominal pain, fatigue, dizziness, and anxiety.
Dutasteride Female Counseling: Dutasteride Counseling:  I discussed with the patient the risks of use of dutasteride including but not limited to decreased libido and sexual dysfunction. Explained the teratogenic nature of the medication and stressed the importance of not getting pregnant during treatment. All of the patient's questions and concerns were addressed.
Rifampin Pregnancy And Lactation Text: This medication is Pregnancy Category C and it isn't know if it is safe during pregnancy. It is also excreted in breast milk and should not be used if you are breast feeding.
Doxepin Counseling:  Patient advised that the medication is sedating and not to drive a car after taking this medication. Patient informed of potential adverse effects including but not limited to dry mouth, urinary retention, and blurry vision.  The patient verbalized understanding of the proper use and possible adverse effects of doxepin.  All of the patient's questions and concerns were addressed.
Adbry Counseling: I discussed with the patient the risks of tralokinumab including but not limited to eye infection and irritation, cold sores, injection site reactions, worsening of asthma, allergic reactions and increased risk of parasitic infection.  Live vaccines should be avoided while taking tralokinumab. The patient understands that monitoring is required and they must alert us or the primary physician if symptoms of infection or other concerning signs are noted.
Acitretin Pregnancy And Lactation Text: This medication is Pregnancy Category X and should not be given to women who are pregnant or may become pregnant in the future. This medication is excreted in breast milk.
Benzoyl Peroxide Counseling: Patient counseled that medicine may cause skin irritation and bleach clothing.  In the event of skin irritation, the patient was advised to reduce the amount of the drug applied or use it less frequently.   The patient verbalized understanding of the proper use and possible adverse effects of benzoyl peroxide.  All of the patient's questions and concerns were addressed.
Sarecycline Counseling: Patient advised regarding possible photosensitivity and discoloration of the teeth, skin, lips, tongue and gums.  Patient instructed to avoid sunlight, if possible.  When exposed to sunlight, patients should wear protective clothing, sunglasses, and sunscreen.  The patient was instructed to call the office immediately if the following severe adverse effects occur:  hearing changes, easy bruising/bleeding, severe headache, or vision changes.  The patient verbalized understanding of the proper use and possible adverse effects of sarecycline.  All of the patient's questions and concerns were addressed.
Methotrexate Pregnancy And Lactation Text: This medication is Pregnancy Category X and is known to cause fetal harm. This medication is excreted in breast milk.
Itraconazole Counseling:  I discussed with the patient the risks of itraconazole including but not limited to liver damage, nausea/vomiting, neuropathy, and severe allergy.  The patient understands that this medication is best absorbed when taken with acidic beverages such as non-diet cola or ginger ale.  The patient understands that monitoring is required including baseline LFTs and repeat LFTs at intervals.  The patient understands that they are to contact us or the primary physician if concerning signs are noted.
Thalidomide Counseling: I discussed with the patient the risks of thalidomide including but not limited to birth defects, anxiety, weakness, chest pain, dizziness, cough and severe allergy.
Olumiant Pregnancy And Lactation Text: Based on animal studies, Olumiant may cause embryo-fetal harm when administered to pregnant women.  The medication should not be used in pregnancy.  Breastfeeding is not recommended during treatment.
Soolantra Counseling: I discussed with the patients the risks of topial Soolantra. This is a medicine which decreases the number of mites and inflammation in the skin. You experience burning, stinging, eye irritation or allergic reactions.  Please call our office if you develop any problems from using this medication.
Azathioprine Counseling:  I discussed with the patient the risks of azathioprine including but not limited to myelosuppression, immunosuppression, hepatotoxicity, lymphoma, and infections.  The patient understands that monitoring is required including baseline LFTs, Creatinine, possible TPMP genotyping and weekly CBCs for the first month and then every 2 weeks thereafter.  The patient verbalized understanding of the proper use and possible adverse effects of azathioprine.  All of the patient's questions and concerns were addressed.
Klisyri Counseling:  I discussed with the patient the risks of Klisyri including but not limited to erythema, scaling, itching, weeping, crusting, and pain.
Adbry Pregnancy And Lactation Text: It is unknown if this medication will adversely affect pregnancy or breast feeding.
Low Dose Naltrexone Pregnancy And Lactation Text: Naltrexone is pregnancy category C.  There have been no adequate and well-controlled studies in pregnant women.  It should be used in pregnancy only if the potential benefit justifies the potential risk to the fetus.   Limited data indicates that naltrexone is minimally excreted into breastmilk.
Topical Metronidazole Counseling: Metronidazole is a topical antibiotic medication. You may experience burning, stinging, redness, or allergic reactions.  Please call our office if you develop any problems from using this medication.
Rinvoq Counseling: I discussed with the patient the risks of Rinvoq therapy including but not limited to upper respiratory tract infections, shingles, cold sores, bronchitis, nausea, cough, fever, acne, and headache. Live vaccines should be avoided.  This medication has been linked to serious infections; higher rate of mortality; malignancy and lymphoproliferative disorders; major adverse cardiovascular events; thrombosis; thrombocytopenia, anemia, and neutropenia; lipid elevations; liver enzyme elevations; and gastrointestinal perforations.
Winlevi Pregnancy And Lactation Text: This medication is considered safe during pregnancy and breastfeeding.
Otezla Counseling: The side effects of Otezla were discussed with the patient, including but not limited to worsening or new depression, weight loss, diarrhea, nausea, upper respiratory tract infection, and headache. Patient instructed to call the office should any adverse effect occur.  The patient verbalized understanding of the proper use and possible adverse effects of Otezla.  All the patient's questions and concerns were addressed.
Erythromycin Pregnancy And Lactation Text: This medication is Pregnancy Category B and is considered safe during pregnancy. It is also excreted in breast milk.
Stelara Counseling:  I discussed with the patient the risks of ustekinumab including but not limited to immunosuppression, malignancy, posterior leukoencephalopathy syndrome, and serious infections.  The patient understands that monitoring is required including a PPD at baseline and must alert us or the primary physician if symptoms of infection or other concerning signs are noted.
Dapsone Counseling: I discussed with the patient the risks of dapsone including but not limited to hemolytic anemia, agranulocytosis, rashes, methemoglobinemia, kidney failure, peripheral neuropathy, headaches, GI upset, and liver toxicity.  Patients who start dapsone require monitoring including baseline LFTs and weekly CBCs for the first month, then every month thereafter.  The patient verbalized understanding of the proper use and possible adverse effects of dapsone.  All of the patient's questions and concerns were addressed.
Soolantra Pregnancy And Lactation Text: This medication is Pregnancy Category C. This medication is considered safe during breast feeding.
Dutasteride Pregnancy And Lactation Text: This medication is absolutely contraindicated in women, especially during pregnancy and breast feeding. Feminization of male fetuses is possible if taking while pregnant.
Benzoyl Peroxide Pregnancy And Lactation Text: This medication is Pregnancy Category C. It is unknown if benzoyl peroxide is excreted in breast milk.
Enbrel Counseling:  I discussed with the patient the risks of etanercept including but not limited to myelosuppression, immunosuppression, autoimmune hepatitis, demyelinating diseases, lymphoma, and infections.  The patient understands that monitoring is required including a PPD at baseline and must alert us or the primary physician if symptoms of infection or other concerning signs are noted.
Azithromycin Counseling:  I discussed with the patient the risks of azithromycin including but not limited to GI upset, allergic reaction, drug rash, diarrhea, and yeast infections.
Rituxan Pregnancy And Lactation Text: This medication is Pregnancy Category C and it isn't know if it is safe during pregnancy. It is unknown if this medication is excreted in breast milk but similar antibodies are known to be excreted.
Erivedge Counseling- I discussed with the patient the risks of Erivedge including but not limited to nausea, vomiting, diarrhea, constipation, weight loss, changes in the sense of taste, decreased appetite, muscle spasms, and hair loss.  The patient verbalized understanding of the proper use and possible adverse effects of Erivedge.  All of the patient's questions and concerns were addressed.
Doxepin Pregnancy And Lactation Text: This medication is Pregnancy Category C and it isn't known if it is safe during pregnancy. It is also excreted in breast milk and breast feeding isn't recommended.
Bexarotene Counseling:  I discussed with the patient the risks of bexarotene including but not limited to hair loss, dry lips/skin/eyes, liver abnormalities, hyperlipidemia, pancreatitis, depression/suicidal ideation, photosensitivity, drug rash/allergic reactions, hypothyroidism, anemia, leukopenia, infection, cataracts, and teratogenicity.  Patient understands that they will need regular blood tests to check lipid profile, liver function tests, white blood cell count, thyroid function tests and pregnancy test if applicable.
Opioid Counseling: I discussed with the patient the potential side effects of opioids including but not limited to addiction, altered mental status, and depression. I stressed avoiding alcohol, benzodiazepines, muscle relaxants and sleep aids unless specifically okayed by a physician. The patient verbalized understanding of the proper use and possible adverse effects of opioids. All of the patient's questions and concerns were addressed. They were instructed to flush the remaining pills down the toilet if they did not need them for pain.
Finasteride Male Counseling: Finasteride Counseling:  I discussed with the patient the risks of use of finasteride including but not limited to decreased libido, decreased ejaculate volume, gynecomastia, and depression. Women should not handle medication.  All of the patient's questions and concerns were addressed.
Siliq Counseling:  I discussed with the patient the risks of Siliq including but not limited to new or worsening depression, suicidal thoughts and behavior, immunosuppression, malignancy, posterior leukoencephalopathy syndrome, and serious infections.  The patient understands that monitoring is required including a PPD at baseline and must alert us or the primary physician if symptoms of infection or other concerning signs are noted. There is also a special program designed to monitor depression which is required with Siliq.
Metronidazole Counseling:  I discussed with the patient the risks of metronidazole including but not limited to seizures, nausea/vomiting, a metallic taste in the mouth, nausea/vomiting and severe allergy.
Klisyri Pregnancy And Lactation Text: It is unknown if this medication can harm a developing fetus or if it is excreted in breast milk.
Protopic Counseling: Patient may experience a mild burning sensation during topical application. Protopic is not approved in children less than 2 years of age. There have been case reports of hematologic and skin malignancies in patients using topical calcineurin inhibitors although causality is questionable.
Bactrim Pregnancy And Lactation Text: This medication is Pregnancy Category D and is known to cause fetal risk.  It is also excreted in breast milk.
Prednisone Counseling:  I discussed with the patient the risks of prolonged use of prednisone including but not limited to weight gain, insomnia, osteoporosis, mood changes, diabetes, susceptibility to infection, glaucoma and high blood pressure.  In cases where prednisone use is prolonged, patients should be monitored with blood pressure checks, serum glucose levels and an eye exam.  Additionally, the patient may need to be placed on GI prophylaxis, PCP prophylaxis, and calcium and vitamin D supplementation and/or a bisphosphonate.  The patient verbalized understanding of the proper use and the possible adverse effects of prednisone.  All of the patient's questions and concerns were addressed.
Elidel Counseling: Patient may experience a mild burning sensation during topical application. Elidel is not approved in children less than 2 years of age. There have been case reports of hematologic and skin malignancies in patients using topical calcineurin inhibitors although causality is questionable.
Topical Retinoid counseling:  Patient advised to apply a pea-sized amount only at bedtime and wait 30 minutes after washing their face before applying.  If too drying, patient may add a non-comedogenic moisturizer. The patient verbalized understanding of the proper use and possible adverse effects of retinoids.  All of the patient's questions and concerns were addressed.
Dapsone Pregnancy And Lactation Text: This medication is Pregnancy Category C and is not considered safe during pregnancy or breast feeding.
Protopic Pregnancy And Lactation Text: This medication is Pregnancy Category C. It is unknown if this medication is excreted in breast milk when applied topically.
Ketoconazole Counseling:   Patient counseled regarding improving absorption with orange juice.  Adverse effects include but are not limited to breast enlargement, headache, diarrhea, nausea, upset stomach, liver function test abnormalities, taste disturbance, and stomach pain.  There is a rare possibility of liver failure that can occur when taking ketoconazole. The patient understands that monitoring of LFTs may be required, especially at baseline. The patient verbalized understanding of the proper use and possible adverse effects of ketoconazole.  All of the patient's questions and concerns were addressed.
Tranexamic Acid Counseling:  Patient advised of the small risk of bleeding problems with tranexamic acid. They were also instructed to call if they developed any nausea, vomiting or diarrhea. All of the patient's questions and concerns were addressed.
Hydroxyzine Counseling: Patient advised that the medication is sedating and not to drive a car after taking this medication.  Patient informed of potential adverse effects including but not limited to dry mouth, urinary retention, and blurry vision.  The patient verbalized understanding of the proper use and possible adverse effects of hydroxyzine.  All of the patient's questions and concerns were addressed.
Niacinamide Counseling: I recommended taking niacin or niacinamide, also know as vitamin B3, twice daily. Recent evidence suggests that taking vitamin B3 (500 mg twice daily) can reduce the risk of actinic keratoses and non-melanoma skin cancers. Side effects of vitamin B3 include flushing and headache.
Bexarotene Pregnancy And Lactation Text: This medication is Pregnancy Category X and should not be given to women who are pregnant or may become pregnant. This medication should not be used if you are breast feeding.
Azithromycin Pregnancy And Lactation Text: This medication is considered safe during pregnancy and is also secreted in breast milk.
Topical Metronidazole Pregnancy And Lactation Text: This medication is Pregnancy Category B and considered safe during pregnancy.  It is also considered safe to use while breastfeeding.
Bimzelx Counseling:  I discussed with the patient the risks of Bimzelx including but not limited to depression, immunosuppression, allergic reactions and infections.  The patient understands that monitoring is required including a PPD at baseline and must alert us or the primary physician if symptoms of infection or other concerning signs are noted.
VTAMA Counseling: I discussed with the patient that VTAMA is not for use in the eyes, mouth or mouth. They should call the office if they develop any signs of allergic reactions to VTAMA. The patient verbalized understanding of the proper use and possible adverse effects of VTAMA.  All of the patient's questions and concerns were addressed.
Rinvoq Pregnancy And Lactation Text: Based on animal studies, Rinvoq may cause embryo-fetal harm when administered to pregnant women.  The medication should not be used in pregnancy.  Breastfeeding is not recommended during treatment and for 6 days after the last dose.
Carac Counseling:  I discussed with the patient the risks of Carac including but not limited to erythema, scaling, itching, weeping, crusting, and pain.
Otezla Pregnancy And Lactation Text: This medication is Pregnancy Category C and it isn't known if it is safe during pregnancy. It is unknown if it is excreted in breast milk.

## 2024-02-22 ENCOUNTER — APPOINTMENT (RX ONLY)
Dept: URBAN - METROPOLITAN AREA CLINIC 31 | Facility: CLINIC | Age: 74
Setting detail: DERMATOLOGY
End: 2024-02-22

## 2024-02-22 DIAGNOSIS — L72.8 OTHER FOLLICULAR CYSTS OF THE SKIN AND SUBCUTANEOUS TISSUE: ICD-10-CM

## 2024-02-22 PROCEDURE — ? COUNSELING

## 2024-02-22 PROCEDURE — ? CONSULTATION EXCISION

## 2024-02-22 PROCEDURE — 99212 OFFICE O/P EST SF 10 MIN: CPT

## 2024-02-22 ASSESSMENT — LOCATION SIMPLE DESCRIPTION DERM: LOCATION SIMPLE: NECK

## 2024-02-22 ASSESSMENT — LOCATION DETAILED DESCRIPTION DERM: LOCATION DETAILED: RIGHT SUPERIOR LATERAL NECK

## 2024-02-22 ASSESSMENT — LOCATION ZONE DERM: LOCATION ZONE: NECK
